# Patient Record
Sex: FEMALE | Race: WHITE | NOT HISPANIC OR LATINO | Employment: OTHER | URBAN - METROPOLITAN AREA
[De-identification: names, ages, dates, MRNs, and addresses within clinical notes are randomized per-mention and may not be internally consistent; named-entity substitution may affect disease eponyms.]

---

## 2017-07-20 PROBLEM — M16.12 ARTHRITIS OF LEFT HIP: Status: ACTIVE | Noted: 2017-07-20

## 2017-07-20 PROBLEM — M50.90 CERVICAL DISC DISEASE: Status: ACTIVE | Noted: 2017-07-20

## 2017-07-20 PROBLEM — M16.12 ARTHRITIS OF LEFT HIP: Status: RESOLVED | Noted: 2017-07-20 | Resolved: 2017-07-20

## 2018-01-22 PROBLEM — K76.0 HEPATIC STEATOSIS: Status: ACTIVE | Noted: 2018-01-22

## 2018-12-21 ENCOUNTER — OFFICE VISIT (OUTPATIENT)
Dept: OBSTETRICS AND GYNECOLOGY | Facility: CLINIC | Age: 68
End: 2018-12-21
Attending: OBSTETRICS & GYNECOLOGY
Payer: MEDICARE

## 2018-12-21 VITALS — HEIGHT: 65 IN | BODY MASS INDEX: 27.29 KG/M2 | WEIGHT: 163.81 LBS

## 2018-12-21 DIAGNOSIS — N95.2 VAGINAL ATROPHY: Primary | ICD-10-CM

## 2018-12-21 PROCEDURE — 99213 OFFICE O/P EST LOW 20 MIN: CPT | Mod: PBBFAC,PN | Performed by: OBSTETRICS & GYNECOLOGY

## 2018-12-21 PROCEDURE — 99203 OFFICE O/P NEW LOW 30 MIN: CPT | Mod: S$PBB,,, | Performed by: OBSTETRICS & GYNECOLOGY

## 2018-12-21 PROCEDURE — 99999 PR PBB SHADOW E&M-EST. PATIENT-LVL III: CPT | Mod: PBBFAC,,, | Performed by: OBSTETRICS & GYNECOLOGY

## 2018-12-21 NOTE — PROGRESS NOTES
"Chief Complaint   Patient presents with    lump in vaginal area       History of Present Illness      68 y.o.  female patient presents today for vaginal bulge noted whrn trying to place vaginal lubricant - no pain or other symptoms.         Past medical and surgical history reviewed.   I have reviewed the patient's medical history in detail and updated the computerized patient record.    Review of patient's allergies indicates:   Allergen Reactions    Ciprofloxacin      Stomach pain      Sulfa (sulfonamide antibiotics)      Stomach          Review of Systems - Negative except HPI  GEN ROS: negative for - chills or fever  Breast ROS: negative for breast lumps  Genito-Urinary ROS: no dysuria, trouble voiding, or hematuria      Physical Examination:  Ht 5' 5" (1.651 m)   Wt 74.3 kg (163 lb 12.8 oz)   BMI 27.26 kg/m²    Constitutional: She is oriented to person, place, and time. She appears well-developed and well-nourished. No distress.   HENT:   Head: Normocephalic and atraumatic.   Eyes: Conjunctivae and EOM are normal. No scleral icterus.   Neck: Normal range of motion. Neck supple. No tracheal deviation present.   Cardiovascular: Normal rate.    Pulmonary/Chest: Effort normal. No respiratory distress. She exhibits no tenderness.  Abdominal: Soft. She exhibits no distension and no mass. There is no tenderness. There is no rebound and no guarding.   Genitourinary:    External rectal exam shows no thrombosed external hemorrhoids.    Pelvic exam was performed with patient supine.   No labial fusion.   There is no rash, lesion or injury on the right labia.   There is no rash, lesion or injury on the left labia.   No bleeding and no signs of injury around the vaginal introitus, urethra is without lesions and well supported.    No vaginal discharge found. Pale and atrophic   No significant Cystocele, Enterocele or rectocele, and cuff well supported.   Bimanual exam:   The urethra and vagina are without " palpable masses or tenderness. AREA OF BULGE IS POSTERIOR FOURCHETTE - OLD EPISIOTOMY SCAR.    Uterus and cervix are surgically absents, vaginal cuff is intact and well supported.   Right adnexum displays no mass and no tenderness.   Left adnexum displays no mass and no tenderness.  Musculoskeletal: Normal range of motion.   Lymphadenopathy: No inguinal adenopathy present.   Neurological: She is alert and oriented to person, place, and time. Coordination normal.   Skin: Skin is warm and dry. She is not diaphoretic.   Psychiatric: She has a normal mood and affect.          Assessment:  NORMAL pelvic - vaginal atrophy  H/o breast cancer    Plan:  hyaloGYN twice weekly  Follow up 1 month if not improving  No need for annual PAP screening.   Patient informed will be contacted with results within 2 weeks. Encouraged to please call back or email if she has not heard from us by then.

## 2019-07-24 PROBLEM — R09.82 POSTNASAL DRIP: Status: ACTIVE | Noted: 2019-07-24

## 2019-07-24 PROBLEM — M79.7 FIBROMYALGIA: Status: ACTIVE | Noted: 2019-07-24

## 2020-12-21 ENCOUNTER — PATIENT MESSAGE (OUTPATIENT)
Dept: OTHER | Facility: OTHER | Age: 70
End: 2020-12-21

## 2021-01-13 ENCOUNTER — CLINICAL SUPPORT (OUTPATIENT)
Dept: URGENT CARE | Facility: CLINIC | Age: 71
End: 2021-01-13
Payer: MEDICARE

## 2021-01-13 DIAGNOSIS — Z11.52 ENCOUNTER FOR SCREENING LABORATORY TESTING FOR COVID-19 VIRUS: Primary | ICD-10-CM

## 2021-01-13 LAB
CTP QC/QA: YES
SARS-COV-2 RDRP RESP QL NAA+PROBE: NEGATIVE

## 2021-01-13 PROCEDURE — U0002: ICD-10-PCS | Mod: QW,CR,S$GLB, | Performed by: FAMILY MEDICINE

## 2021-01-13 PROCEDURE — U0002 COVID-19 LAB TEST NON-CDC: HCPCS | Mod: QW,CR,S$GLB, | Performed by: FAMILY MEDICINE

## 2021-02-19 ENCOUNTER — OFFICE VISIT (OUTPATIENT)
Dept: SPINE | Facility: CLINIC | Age: 71
End: 2021-02-19
Payer: MEDICARE

## 2021-02-19 ENCOUNTER — TELEPHONE (OUTPATIENT)
Dept: SPINE | Facility: CLINIC | Age: 71
End: 2021-02-19

## 2021-02-19 VITALS
WEIGHT: 170.88 LBS | SYSTOLIC BLOOD PRESSURE: 169 MMHG | HEART RATE: 71 BPM | BODY MASS INDEX: 32.26 KG/M2 | DIASTOLIC BLOOD PRESSURE: 79 MMHG | HEIGHT: 61 IN

## 2021-02-19 DIAGNOSIS — M47.812 CERVICAL SPONDYLOSIS: Primary | ICD-10-CM

## 2021-02-19 DIAGNOSIS — M50.90 CERVICAL DISC DISEASE: ICD-10-CM

## 2021-02-19 DIAGNOSIS — M54.12 RADICULOPATHY, CERVICAL REGION: ICD-10-CM

## 2021-02-19 PROCEDURE — 99204 OFFICE O/P NEW MOD 45 MIN: CPT | Mod: S$PBB,,, | Performed by: PHYSICIAN ASSISTANT

## 2021-02-19 PROCEDURE — 99999 PR PBB SHADOW E&M-EST. PATIENT-LVL V: CPT | Mod: PBBFAC,,, | Performed by: PHYSICIAN ASSISTANT

## 2021-02-19 PROCEDURE — 99215 OFFICE O/P EST HI 40 MIN: CPT | Mod: PBBFAC,PN | Performed by: PHYSICIAN ASSISTANT

## 2021-02-19 PROCEDURE — 99999 PR PBB SHADOW E&M-EST. PATIENT-LVL V: ICD-10-PCS | Mod: PBBFAC,,, | Performed by: PHYSICIAN ASSISTANT

## 2021-02-19 PROCEDURE — 99204 PR OFFICE/OUTPT VISIT, NEW, LEVL IV, 45-59 MIN: ICD-10-PCS | Mod: S$PBB,,, | Performed by: PHYSICIAN ASSISTANT

## 2021-02-19 RX ORDER — TIZANIDINE 2 MG/1
2 TABLET ORAL NIGHTLY PRN
Qty: 40 TABLET | Refills: 0 | Status: SHIPPED | OUTPATIENT
Start: 2021-02-19 | End: 2021-02-19

## 2021-03-08 ENCOUNTER — HOSPITAL ENCOUNTER (OUTPATIENT)
Dept: RADIOLOGY | Facility: HOSPITAL | Age: 71
Discharge: HOME | End: 2021-03-08
Attending: PHYSICIAN ASSISTANT
Payer: MEDICARE

## 2021-03-08 DIAGNOSIS — M54.12 RADICULOPATHY, CERVICAL REGION: ICD-10-CM

## 2021-03-08 DIAGNOSIS — M47.812 CERVICAL SPONDYLOSIS: ICD-10-CM

## 2021-03-08 DIAGNOSIS — M50.90 CERVICAL DISC DISEASE: ICD-10-CM

## 2021-03-08 PROCEDURE — 72052 X-RAY EXAM NECK SPINE 6/>VWS: CPT | Mod: TC,FY,PO

## 2021-03-08 PROCEDURE — 72141 MRI CERVICAL SPINE WITHOUT CONTRAST: ICD-10-PCS | Mod: 26,,, | Performed by: RADIOLOGY

## 2021-03-08 PROCEDURE — 72052 X-RAY EXAM NECK SPINE 6/>VWS: CPT | Mod: 26,,, | Performed by: RADIOLOGY

## 2021-03-08 PROCEDURE — 72141 MRI NECK SPINE W/O DYE: CPT | Mod: TC,PO

## 2021-03-08 PROCEDURE — 72052 XR CERVICAL SPINE 5 VIEW WITH FLEX AND EXT: ICD-10-PCS | Mod: 26,,, | Performed by: RADIOLOGY

## 2021-03-08 PROCEDURE — 72141 MRI NECK SPINE W/O DYE: CPT | Mod: 26,,, | Performed by: RADIOLOGY

## 2021-03-11 ENCOUNTER — OFFICE VISIT (OUTPATIENT)
Dept: SPINE | Facility: CLINIC | Age: 71
End: 2021-03-11
Payer: MEDICARE

## 2021-03-11 VITALS
HEART RATE: 75 BPM | WEIGHT: 170.88 LBS | DIASTOLIC BLOOD PRESSURE: 82 MMHG | HEIGHT: 61 IN | SYSTOLIC BLOOD PRESSURE: 174 MMHG | BODY MASS INDEX: 32.26 KG/M2

## 2021-03-11 DIAGNOSIS — M54.12 RADICULOPATHY, CERVICAL REGION: Primary | ICD-10-CM

## 2021-03-11 DIAGNOSIS — M47.812 CERVICAL SPONDYLOSIS: ICD-10-CM

## 2021-03-11 PROCEDURE — 99214 OFFICE O/P EST MOD 30 MIN: CPT | Mod: S$PBB,,, | Performed by: PHYSICIAN ASSISTANT

## 2021-03-11 PROCEDURE — 99999 PR PBB SHADOW E&M-EST. PATIENT-LVL V: CPT | Mod: PBBFAC,,, | Performed by: PHYSICIAN ASSISTANT

## 2021-03-11 PROCEDURE — 99999 PR PBB SHADOW E&M-EST. PATIENT-LVL V: ICD-10-PCS | Mod: PBBFAC,,, | Performed by: PHYSICIAN ASSISTANT

## 2021-03-11 PROCEDURE — 99215 OFFICE O/P EST HI 40 MIN: CPT | Mod: PBBFAC,PN | Performed by: PHYSICIAN ASSISTANT

## 2021-03-11 PROCEDURE — 99214 PR OFFICE/OUTPT VISIT, EST, LEVL IV, 30-39 MIN: ICD-10-PCS | Mod: S$PBB,,, | Performed by: PHYSICIAN ASSISTANT

## 2021-04-21 ENCOUNTER — OFFICE VISIT (OUTPATIENT)
Dept: PAIN MEDICINE | Facility: CLINIC | Age: 71
End: 2021-04-21
Payer: MEDICARE

## 2021-04-21 VITALS
SYSTOLIC BLOOD PRESSURE: 178 MMHG | DIASTOLIC BLOOD PRESSURE: 80 MMHG | HEIGHT: 61 IN | HEART RATE: 73 BPM | OXYGEN SATURATION: 98 % | WEIGHT: 168.88 LBS | BODY MASS INDEX: 31.88 KG/M2

## 2021-04-21 DIAGNOSIS — Z01.818 PRE-OP TESTING: ICD-10-CM

## 2021-04-21 DIAGNOSIS — M47.812 CERVICAL SPONDYLOSIS: ICD-10-CM

## 2021-04-21 DIAGNOSIS — M50.30 DDD (DEGENERATIVE DISC DISEASE), CERVICAL: ICD-10-CM

## 2021-04-21 DIAGNOSIS — M54.12 RADICULOPATHY, CERVICAL REGION: Primary | ICD-10-CM

## 2021-04-21 PROCEDURE — 99204 PR OFFICE/OUTPT VISIT, NEW, LEVL IV, 45-59 MIN: ICD-10-PCS | Mod: S$PBB,,, | Performed by: ANESTHESIOLOGY

## 2021-04-21 PROCEDURE — 99999 PR PBB SHADOW E&M-EST. PATIENT-LVL IV: ICD-10-PCS | Mod: PBBFAC,,, | Performed by: ANESTHESIOLOGY

## 2021-04-21 PROCEDURE — 99999 PR PBB SHADOW E&M-EST. PATIENT-LVL IV: CPT | Mod: PBBFAC,,, | Performed by: ANESTHESIOLOGY

## 2021-04-21 PROCEDURE — 99204 OFFICE O/P NEW MOD 45 MIN: CPT | Mod: S$PBB,,, | Performed by: ANESTHESIOLOGY

## 2021-04-21 PROCEDURE — 99214 OFFICE O/P EST MOD 30 MIN: CPT | Mod: PBBFAC,PN | Performed by: ANESTHESIOLOGY

## 2021-04-21 RX ORDER — SODIUM CHLORIDE, SODIUM LACTATE, POTASSIUM CHLORIDE, CALCIUM CHLORIDE 600; 310; 30; 20 MG/100ML; MG/100ML; MG/100ML; MG/100ML
INJECTION, SOLUTION INTRAVENOUS CONTINUOUS
Status: CANCELLED | OUTPATIENT
Start: 2021-05-06

## 2021-04-21 RX ORDER — TIZANIDINE 4 MG/1
4 TABLET ORAL NIGHTLY PRN
Qty: 30 TABLET | Refills: 0 | Status: SHIPPED | OUTPATIENT
Start: 2021-04-21 | End: 2021-05-21

## 2021-05-03 ENCOUNTER — LAB VISIT (OUTPATIENT)
Dept: FAMILY MEDICINE | Facility: CLINIC | Age: 71
End: 2021-05-03
Payer: MEDICARE

## 2021-05-03 DIAGNOSIS — Z01.818 PRE-OP TESTING: ICD-10-CM

## 2021-05-03 PROCEDURE — U0003 INFECTIOUS AGENT DETECTION BY NUCLEIC ACID (DNA OR RNA); SEVERE ACUTE RESPIRATORY SYNDROME CORONAVIRUS 2 (SARS-COV-2) (CORONAVIRUS DISEASE [COVID-19]), AMPLIFIED PROBE TECHNIQUE, MAKING USE OF HIGH THROUGHPUT TECHNOLOGIES AS DESCRIBED BY CMS-2020-01-R: HCPCS | Performed by: ANESTHESIOLOGY

## 2021-05-03 PROCEDURE — U0005 INFEC AGEN DETEC AMPLI PROBE: HCPCS | Performed by: ANESTHESIOLOGY

## 2021-05-04 LAB — SARS-COV-2 RNA RESP QL NAA+PROBE: NOT DETECTED

## 2021-05-06 ENCOUNTER — HOSPITAL ENCOUNTER (OUTPATIENT)
Dept: RADIOLOGY | Facility: HOSPITAL | Age: 71
Discharge: HOME | End: 2021-05-06
Attending: ANESTHESIOLOGY
Payer: MEDICARE

## 2021-05-06 ENCOUNTER — HOSPITAL ENCOUNTER (OUTPATIENT)
Facility: HOSPITAL | Age: 71
Discharge: HOME | End: 2021-05-06
Attending: ANESTHESIOLOGY | Admitting: ANESTHESIOLOGY
Payer: MEDICARE

## 2021-05-06 DIAGNOSIS — M54.12 RADICULOPATHY, CERVICAL REGION: ICD-10-CM

## 2021-05-06 PROCEDURE — 62321 NJX INTERLAMINAR CRV/THRC: CPT | Mod: PO | Performed by: ANESTHESIOLOGY

## 2021-05-06 PROCEDURE — 63600175 PHARM REV CODE 636 W HCPCS: Mod: PO | Performed by: ANESTHESIOLOGY

## 2021-05-06 PROCEDURE — 62321 PR INJ CERV/THORAC, W/GUIDANCE: ICD-10-PCS | Mod: ,,, | Performed by: ANESTHESIOLOGY

## 2021-05-06 PROCEDURE — 25000003 PHARM REV CODE 250: Mod: PO | Performed by: ANESTHESIOLOGY

## 2021-05-06 PROCEDURE — 62321 NJX INTERLAMINAR CRV/THRC: CPT | Mod: ,,, | Performed by: ANESTHESIOLOGY

## 2021-05-06 PROCEDURE — 76000 FLUOROSCOPY <1 HR PHYS/QHP: CPT | Mod: TC,PO

## 2021-05-06 PROCEDURE — 25500020 PHARM REV CODE 255: Mod: PO | Performed by: ANESTHESIOLOGY

## 2021-05-06 RX ORDER — MIDAZOLAM HYDROCHLORIDE 2 MG/2ML
INJECTION, SOLUTION INTRAMUSCULAR; INTRAVENOUS
Status: DISCONTINUED | OUTPATIENT
Start: 2021-05-06 | End: 2021-05-06 | Stop reason: HOSPADM

## 2021-05-06 RX ORDER — METHYLPREDNISOLONE ACETATE 80 MG/ML
INJECTION, SUSPENSION INTRA-ARTICULAR; INTRALESIONAL; INTRAMUSCULAR; SOFT TISSUE
Status: DISCONTINUED | OUTPATIENT
Start: 2021-05-06 | End: 2021-05-06 | Stop reason: HOSPADM

## 2021-05-06 RX ORDER — HYDROCODONE BITARTRATE AND ACETAMINOPHEN 5; 325 MG/1; MG/1
1 TABLET ORAL ONCE
Status: COMPLETED | OUTPATIENT
Start: 2021-05-06 | End: 2021-05-06

## 2021-05-06 RX ORDER — SODIUM CHLORIDE, SODIUM LACTATE, POTASSIUM CHLORIDE, CALCIUM CHLORIDE 600; 310; 30; 20 MG/100ML; MG/100ML; MG/100ML; MG/100ML
INJECTION, SOLUTION INTRAVENOUS CONTINUOUS
Status: DISCONTINUED | OUTPATIENT
Start: 2021-05-06 | End: 2021-05-06 | Stop reason: HOSPADM

## 2021-05-06 RX ORDER — LIDOCAINE HYDROCHLORIDE 10 MG/ML
INJECTION, SOLUTION EPIDURAL; INFILTRATION; INTRACAUDAL; PERINEURAL
Status: DISCONTINUED | OUTPATIENT
Start: 2021-05-06 | End: 2021-05-06 | Stop reason: HOSPADM

## 2021-05-06 RX ADMIN — SODIUM CHLORIDE, SODIUM LACTATE, POTASSIUM CHLORIDE, AND CALCIUM CHLORIDE: .6; .31; .03; .02 INJECTION, SOLUTION INTRAVENOUS at 01:05

## 2021-05-06 RX ADMIN — HYDROCODONE BITARTRATE AND ACETAMINOPHEN 1 TABLET: 5; 325 TABLET ORAL at 03:05

## 2021-05-07 VITALS
TEMPERATURE: 98 F | OXYGEN SATURATION: 95 % | HEIGHT: 61 IN | SYSTOLIC BLOOD PRESSURE: 181 MMHG | RESPIRATION RATE: 18 BRPM | BODY MASS INDEX: 31.72 KG/M2 | DIASTOLIC BLOOD PRESSURE: 77 MMHG | HEART RATE: 80 BPM | WEIGHT: 168 LBS

## 2021-05-27 ENCOUNTER — OFFICE VISIT (OUTPATIENT)
Dept: PAIN MEDICINE | Facility: CLINIC | Age: 71
End: 2021-05-27
Payer: MEDICARE

## 2021-05-27 VITALS
OXYGEN SATURATION: 97 % | TEMPERATURE: 96 F | RESPIRATION RATE: 18 BRPM | BODY MASS INDEX: 32.32 KG/M2 | WEIGHT: 171.06 LBS | HEART RATE: 79 BPM | DIASTOLIC BLOOD PRESSURE: 66 MMHG | SYSTOLIC BLOOD PRESSURE: 149 MMHG

## 2021-05-27 DIAGNOSIS — M47.812 CERVICAL SPONDYLOSIS: ICD-10-CM

## 2021-05-27 DIAGNOSIS — M79.18 MYOFASCIAL PAIN: ICD-10-CM

## 2021-05-27 DIAGNOSIS — M50.30 DDD (DEGENERATIVE DISC DISEASE), CERVICAL: Primary | ICD-10-CM

## 2021-05-27 PROCEDURE — 99214 OFFICE O/P EST MOD 30 MIN: CPT | Mod: S$PBB,,, | Performed by: PHYSICIAN ASSISTANT

## 2021-05-27 PROCEDURE — 99214 PR OFFICE/OUTPT VISIT, EST, LEVL IV, 30-39 MIN: ICD-10-PCS | Mod: S$PBB,,, | Performed by: PHYSICIAN ASSISTANT

## 2021-05-27 PROCEDURE — 99999 PR PBB SHADOW E&M-EST. PATIENT-LVL IV: ICD-10-PCS | Mod: PBBFAC,,, | Performed by: PHYSICIAN ASSISTANT

## 2021-05-27 PROCEDURE — 99214 OFFICE O/P EST MOD 30 MIN: CPT | Mod: PBBFAC,PN | Performed by: PHYSICIAN ASSISTANT

## 2021-05-27 PROCEDURE — 99999 PR PBB SHADOW E&M-EST. PATIENT-LVL IV: CPT | Mod: PBBFAC,,, | Performed by: PHYSICIAN ASSISTANT

## 2021-05-31 ENCOUNTER — PATIENT MESSAGE (OUTPATIENT)
Dept: PAIN MEDICINE | Facility: CLINIC | Age: 71
End: 2021-05-31

## 2023-09-18 PROBLEM — F33.1 MAJOR DEPRESSIVE DISORDER, RECURRENT, MODERATE: Status: ACTIVE | Noted: 2023-09-18

## 2024-03-12 ENCOUNTER — TELEPHONE (OUTPATIENT)
Dept: HEMATOLOGY/ONCOLOGY | Facility: CLINIC | Age: 74
End: 2024-03-12
Payer: MEDICARE

## 2024-03-12 NOTE — TELEPHONE ENCOUNTER
Received msg, Secure chat from Dr Juárez about pt's referral in [facility] and to schedule her on oncology ivana.  [facility] for pt to call back to schedule.      NOTE:  Per referring physician, MASHA Doran NP, she was unable to contact the pt to inform her of the [facility] referral.      Pt returned call.  She scheduled appt with hemon with Dr Juárez for next Monday.    She said she just scheduled appt with Dr Juan for 3/27.     Per MASHA Doran, I informed her that  MASHA Doran NP will be calling her today to discuss results and any questions she may have.  Pt confirmed the appts and no other needs at this time.

## 2024-03-18 ENCOUNTER — OFFICE VISIT (OUTPATIENT)
Dept: HEMATOLOGY/ONCOLOGY | Facility: CLINIC | Age: 74
End: 2024-03-18
Payer: MEDICARE

## 2024-03-18 ENCOUNTER — LAB VISIT (OUTPATIENT)
Dept: LAB | Facility: HOSPITAL | Age: 74
End: 2024-03-18
Attending: INTERNAL MEDICINE
Payer: MEDICARE

## 2024-03-18 VITALS
RESPIRATION RATE: 16 BRPM | HEIGHT: 61 IN | BODY MASS INDEX: 32.84 KG/M2 | OXYGEN SATURATION: 96 % | SYSTOLIC BLOOD PRESSURE: 154 MMHG | HEART RATE: 89 BPM | DIASTOLIC BLOOD PRESSURE: 80 MMHG | TEMPERATURE: 98 F | WEIGHT: 173.94 LBS

## 2024-03-18 DIAGNOSIS — R53.82 CHRONIC FATIGUE, UNSPECIFIED: ICD-10-CM

## 2024-03-18 DIAGNOSIS — R92.8 ABNORMAL MAMMOGRAM OF LEFT BREAST: ICD-10-CM

## 2024-03-18 DIAGNOSIS — Z85.3 HISTORY OF BREAST CANCER: ICD-10-CM

## 2024-03-18 DIAGNOSIS — C82.14 GRADE 2 FOLLICULAR LYMPHOMA OF LYMPH NODES OF AXILLA: ICD-10-CM

## 2024-03-18 PROBLEM — C82.90 FOLLICULAR LYMPHOMA: Status: ACTIVE | Noted: 2024-03-18

## 2024-03-18 LAB
ALBUMIN SERPL BCP-MCNC: 4.6 G/DL (ref 3.5–5.2)
ALP SERPL-CCNC: 49 U/L (ref 55–135)
ALT SERPL W/O P-5'-P-CCNC: 51 U/L (ref 10–44)
ANION GAP SERPL CALC-SCNC: 11 MMOL/L (ref 8–16)
AST SERPL-CCNC: 32 U/L (ref 10–40)
BASOPHILS # BLD AUTO: 0.04 K/UL (ref 0–0.2)
BASOPHILS NFR BLD: 0.5 % (ref 0–1.9)
BILIRUB SERPL-MCNC: 0.4 MG/DL (ref 0.1–1)
BUN SERPL-MCNC: 18 MG/DL (ref 8–23)
CALCIUM SERPL-MCNC: 10.2 MG/DL (ref 8.7–10.5)
CHLORIDE SERPL-SCNC: 101 MMOL/L (ref 95–110)
CO2 SERPL-SCNC: 27 MMOL/L (ref 23–29)
CREAT SERPL-MCNC: 0.9 MG/DL (ref 0.5–1.4)
DIFFERENTIAL METHOD BLD: NORMAL
EOSINOPHIL # BLD AUTO: 0.1 K/UL (ref 0–0.5)
EOSINOPHIL NFR BLD: 1.7 % (ref 0–8)
ERYTHROCYTE [DISTWIDTH] IN BLOOD BY AUTOMATED COUNT: 12.6 % (ref 11.5–14.5)
ERYTHROCYTE [SEDIMENTATION RATE] IN BLOOD BY PHOTOMETRIC METHOD: 18 MM/HR (ref 0–36)
EST. GFR  (NO RACE VARIABLE): >60 ML/MIN/1.73 M^2
GLUCOSE SERPL-MCNC: 148 MG/DL (ref 70–110)
HAV IGM SERPL QL IA: NORMAL
HBV CORE IGM SERPL QL IA: NORMAL
HBV SURFACE AG SERPL QL IA: NORMAL
HCT VFR BLD AUTO: 40.8 % (ref 37–48.5)
HCV AB SERPL QL IA: NORMAL
HGB BLD-MCNC: 14.7 G/DL (ref 12–16)
HIV 1+2 AB+HIV1 P24 AG SERPL QL IA: NORMAL
IMM GRANULOCYTES # BLD AUTO: 0.03 K/UL (ref 0–0.04)
IMM GRANULOCYTES NFR BLD AUTO: 0.4 % (ref 0–0.5)
LDH SERPL L TO P-CCNC: 191 U/L (ref 110–260)
LYMPHOCYTES # BLD AUTO: 2.4 K/UL (ref 1–4.8)
LYMPHOCYTES NFR BLD: 28.7 % (ref 18–48)
MCH RBC QN AUTO: 29.7 PG (ref 27–31)
MCHC RBC AUTO-ENTMCNC: 36 G/DL (ref 32–36)
MCV RBC AUTO: 82 FL (ref 82–98)
MONOCYTES # BLD AUTO: 0.5 K/UL (ref 0.3–1)
MONOCYTES NFR BLD: 5.7 % (ref 4–15)
NEUTROPHILS # BLD AUTO: 5.3 K/UL (ref 1.8–7.7)
NEUTROPHILS NFR BLD: 63 % (ref 38–73)
NRBC BLD-RTO: 0 /100 WBC
PLATELET # BLD AUTO: 228 K/UL (ref 150–450)
PMV BLD AUTO: 10.3 FL (ref 9.2–12.9)
POTASSIUM SERPL-SCNC: 3.8 MMOL/L (ref 3.5–5.1)
PROT SERPL-MCNC: 7.5 G/DL (ref 6–8.4)
RBC # BLD AUTO: 4.95 M/UL (ref 4–5.4)
SODIUM SERPL-SCNC: 139 MMOL/L (ref 136–145)
URATE SERPL-MCNC: 5.7 MG/DL (ref 2.4–5.7)
WBC # BLD AUTO: 8.44 K/UL (ref 3.9–12.7)

## 2024-03-18 PROCEDURE — 1160F RVW MEDS BY RX/DR IN RCRD: CPT | Mod: CPTII,S$GLB,, | Performed by: INTERNAL MEDICINE

## 2024-03-18 PROCEDURE — 36415 COLL VENOUS BLD VENIPUNCTURE: CPT | Mod: PN | Performed by: INTERNAL MEDICINE

## 2024-03-18 PROCEDURE — 1126F AMNT PAIN NOTED NONE PRSNT: CPT | Mod: CPTII,S$GLB,, | Performed by: INTERNAL MEDICINE

## 2024-03-18 PROCEDURE — 3008F BODY MASS INDEX DOCD: CPT | Mod: CPTII,S$GLB,, | Performed by: INTERNAL MEDICINE

## 2024-03-18 PROCEDURE — 85025 COMPLETE CBC W/AUTO DIFF WBC: CPT | Mod: PN | Performed by: INTERNAL MEDICINE

## 2024-03-18 PROCEDURE — 80053 COMPREHEN METABOLIC PANEL: CPT | Mod: PO | Performed by: INTERNAL MEDICINE

## 2024-03-18 PROCEDURE — 1159F MED LIST DOCD IN RCRD: CPT | Mod: CPTII,S$GLB,, | Performed by: INTERNAL MEDICINE

## 2024-03-18 PROCEDURE — 4010F ACE/ARB THERAPY RXD/TAKEN: CPT | Mod: CPTII,S$GLB,, | Performed by: INTERNAL MEDICINE

## 2024-03-18 PROCEDURE — 99205 OFFICE O/P NEW HI 60 MIN: CPT | Mod: S$GLB,,, | Performed by: INTERNAL MEDICINE

## 2024-03-18 PROCEDURE — 87389 HIV-1 AG W/HIV-1&-2 AB AG IA: CPT | Performed by: INTERNAL MEDICINE

## 2024-03-18 PROCEDURE — 1101F PT FALLS ASSESS-DOCD LE1/YR: CPT | Mod: CPTII,S$GLB,, | Performed by: INTERNAL MEDICINE

## 2024-03-18 PROCEDURE — 3077F SYST BP >= 140 MM HG: CPT | Mod: CPTII,S$GLB,, | Performed by: INTERNAL MEDICINE

## 2024-03-18 PROCEDURE — 80074 ACUTE HEPATITIS PANEL: CPT | Performed by: INTERNAL MEDICINE

## 2024-03-18 PROCEDURE — 84550 ASSAY OF BLOOD/URIC ACID: CPT | Mod: PO | Performed by: INTERNAL MEDICINE

## 2024-03-18 PROCEDURE — 3288F FALL RISK ASSESSMENT DOCD: CPT | Mod: CPTII,S$GLB,, | Performed by: INTERNAL MEDICINE

## 2024-03-18 PROCEDURE — 99999 PR PBB SHADOW E&M-EST. PATIENT-LVL IV: CPT | Mod: PBBFAC,,, | Performed by: INTERNAL MEDICINE

## 2024-03-18 PROCEDURE — 85652 RBC SED RATE AUTOMATED: CPT | Performed by: INTERNAL MEDICINE

## 2024-03-18 PROCEDURE — 83615 LACTATE (LD) (LDH) ENZYME: CPT | Mod: PO | Performed by: INTERNAL MEDICINE

## 2024-03-18 PROCEDURE — 3079F DIAST BP 80-89 MM HG: CPT | Mod: CPTII,S$GLB,, | Performed by: INTERNAL MEDICINE

## 2024-03-18 RX ORDER — SOLIFENACIN SUCCINATE 5 MG/1
5 TABLET, FILM COATED ORAL DAILY PRN
COMMUNITY
Start: 2024-01-22

## 2024-03-18 NOTE — PROGRESS NOTES
Answers submitted by the patient for this visit:  Review of Systems Questionnaire (Submitted on 3/12/2024)  appetite change : No  unexpected weight change: No  mouth sores: No  visual disturbance: No  cough: No  shortness of breath: No  chest pain: No  abdominal pain: No  diarrhea: No  frequency: No  back pain: No  rash: No  headaches: No  adenopathy: No  nervous/ anxious: Yes      Subjective:       Name: Xiao Fuentes  : 1950  MRN: [id number]    Chief Complaint   Patient presents with    Follicular lymphoma     Follicular lymphoma, hx breast ca/ [place]        Patient is in clinic with her     HPI: Xiao Fuentes is a 73 y.o. female presents for evaluation of her newly diagnosed with Follicular lymphoma (Follicular lymphoma, hx breast ca/ [place])    Patient underwent on 2024 a mammogram diagnostic with left deanna an ultrasound of the left breast came in view of her personal history of breast cancer.  She was found to have a left breast mass measuring 6 mm x 4 mm x 6 mm at 2:00 a.m..  There was an abnormal lymph node also noted an ultrasound-guided biopsy was recommended.    She underwent on 2024 a left breast biopsy that showed no tumor seen.  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  The Ki-67 was very low.      The patient denies any fever chills night sweats unintentional weight loss nausea vomiting abdominal pain cough phlegm melena hematochezia or hematemesis.    Mother had NHL in her late 70's. She had a stage 3. She had chemotherapy and wet into remission. She relapse nichol year. She received further systemic therapy.  First paternal cousin had breast cancer in her 40's    Oncology History    No history exists.        Past Medical History:   Diagnosis Date    Anxiety     Breast cancer     Colon polyps     Depression     Gastro-esophageal reflux     History of chicken pox     Hyperlipidemia     Hypothyroidism      Migraine     Shingles 10/31/2018    seen by urgent care    Usual hyperplasia of lactiferous duct        Past Surgical History:   Procedure Laterality Date    BREAST BIOPSY Left 1998    BREAST BIOPSY Bilateral 1999    BREAST LUMPECTOMY Left 1998    pt had radiation    CARPAL TUNNEL RELEASE      COLONOSCOPY      EPIDURAL STEROID INJECTION INTO CERVICAL SPINE N/A 2021    Procedure: Injection-steroid-epidural-cervical;  Surgeon: Tad Dyer MD;  Location: [facility] OR;  Service: Pain Management;  Laterality: N/A;    HYSTERECTOMY      OOPHORECTOMY      TOE SURGERY Right     right big toe ingrown        Family History   Problem Relation Age of Onset    Lymphoma Mother     Thyroid disease Mother     Hypertension Mother     Diabetes Father     Heart disease Father     Hyperlipidemia Father     Hypertension Father     Stroke Father     Breast cancer Paternal Cousin        Social History     Socioeconomic History    Marital status:      Spouse name: Fredrick    Number of children: 3   Tobacco Use    Smoking status: Former     Current packs/day: 0.00     Types: Cigarettes     Quit date: 1976     Years since quittin.2    Smokeless tobacco: Never   Substance and Sexual Activity    Alcohol use: Yes     Alcohol/week: 1.0 standard drink of alcohol     Types: 1 Glasses of wine per week     Comment: rarely    Drug use: No    Sexual activity: Yes     Partners: Male     Social Determinants of Health     Financial Resource Strain: Low Risk  (11/10/2023)    Overall Financial Resource Strain (CARDIA)     Difficulty of Paying Living Expenses: Not hard at all   Food Insecurity: No Food Insecurity (11/10/2023)    Hunger Vital Sign     Worried About Running Out of Food in the Last Year: Never true     Ran Out of Food in the Last Year: Never true   Transportation Needs: No Transportation Needs (11/10/2023)    PRAPARE - Transportation     Lack of Transportation (Medical): No     Lack of Transportation (Non-Medical): No    Physical Activity: Unknown (11/10/2023)    Exercise Vital Sign     Days of Exercise per Week: 0 days   Recent Concern: Physical Activity - Inactive (11/10/2023)    Exercise Vital Sign     Days of Exercise per Week: 0 days     Minutes of Exercise per Session: 0 min   Stress: Stress Concern Present (11/10/2023)    [place] [employer] - Occupational Stress Questionnaire     Feeling of Stress : To some extent   Social Connections: Unknown (11/10/2023)    Social Connection and Isolation Panel [NHANES]     Frequency of Communication with Friends and Family: More than three times a week     Frequency of Social Gatherings with Friends and Family: More than three times a week     Active Member of Clubs or Organizations: No     Attends Club or Organization Meetings: Never     Marital Status:    Housing Stability: Unknown (11/10/2023)    Housing Stability Vital Sign     Unable to Pay for Housing in the Last Year: No     Unstable Housing in the Last Year: No       Review of patient's allergies indicates:   Allergen Reactions    Ciprofloxacin      Stomach pain      Sulfa (sulfonamide antibiotics)      Stomach        Review of Systems   Constitutional:  Negative for appetite change and unexpected weight change.   HENT:  Negative for mouth sores.    Eyes:  Negative for visual disturbance.   Respiratory:  Negative for cough and shortness of breath.    Cardiovascular:  Negative for chest pain.   Gastrointestinal:  Negative for abdominal pain and diarrhea.   Genitourinary:  Negative for frequency.   Musculoskeletal:  Negative for back pain.   Integumentary:  Negative for rash.   Neurological:  Negative for headaches.   Hematological:  Negative for adenopathy.   Psychiatric/Behavioral:  The patient is nervous/anxious.             Objective:     Vitals:    03/18/24 1117   BP: (!) 154/80   BP Location: Left arm   Patient Position: Sitting   BP Method: Medium (Automatic)   Pulse: 89   Resp: 16   Temp: 98 °F  "(36.7 °C)   TempSrc: Oral   SpO2: 96%   Weight: 78.9 kg (173 lb 15.1 oz)   Height: 5' 1" (1.549 m)        Physical Exam  Vitals reviewed.   Constitutional:       Appearance: Normal appearance.   Eyes:      General: No scleral icterus.     Pupils: Pupils are equal, round, and reactive to light.   Cardiovascular:      Rate and Rhythm: Normal rate and regular rhythm.      Pulses: Normal pulses.      Heart sounds: Normal heart sounds.   Pulmonary:      Effort: Pulmonary effort is normal.      Breath sounds: Normal breath sounds.   Abdominal:      General: Bowel sounds are normal. There is no distension.   Musculoskeletal:         General: No swelling.   Lymphadenopathy:      Cervical: No cervical adenopathy.   Skin:     General: Skin is warm.      Findings: No rash.   Neurological:      General: No focal deficit present.      Mental Status: She is alert.   Psychiatric:         Mood and Affect: Mood normal.                Current Outpatient Medications on File Prior to Visit   Medication Sig    amLODIPine (NORVASC) 10 MG tablet Take 1 tablet (10 mg total) by mouth once daily.    hydroCHLOROthiazide (HYDRODIURIL) 12.5 MG Tab Take 1 tablet (12.5 mg total) by mouth once daily.    levothyroxine (SYNTHROID) 75 MCG tablet Take 1 tablet (75 mcg total) by mouth once daily.    lisinopriL (PRINIVIL,ZESTRIL) 40 MG tablet Take 1 tablet (40 mg total) by mouth once daily.    LORazepam (ATIVAN) 1 MG tablet TAKE 1 TABLET BY MOUTH TWICE DAILY AS NEEDED FOR ANXIETY    omeprazole (PRILOSEC) 40 MG capsule Take 1 capsule (40 mg total) by mouth every morning.    rosuvastatin (CRESTOR) 5 MG tablet Take 1 tablet (5 mg total) by mouth once daily.    sertraline (ZOLOFT) 100 MG tablet TAKE 1 TABLET (100 MG TOTAL) BY MOUTH ONCE DAILY.    solifenacin (VESICARE) 5 MG tablet Take 5 mg by mouth.     Current Facility-Administered Medications on File Prior to Visit   Medication    LIDOcaine-EPINEPHrine (PF) 1%-1:200,000 injection 20 mL       CBC:  Lab " Results   Component Value Date    WBC 8.44 03/18/2024    HGB 14.7 03/18/2024    HCT 40.8 03/18/2024    MCV 82 03/18/2024     03/18/2024         CMP:  Sodium   Date Value Ref Range Status   03/18/2024 139 136 - 145 mmol/L Final     Potassium   Date Value Ref Range Status   03/18/2024 3.8 3.5 - 5.1 mmol/L Final     Chloride   Date Value Ref Range Status   03/18/2024 101 95 - 110 mmol/L Final     CO2   Date Value Ref Range Status   03/18/2024 27 23 - 29 mmol/L Final     Glucose   Date Value Ref Range Status   03/18/2024 148 (H) 70 - 110 mg/dL Final     BUN   Date Value Ref Range Status   03/18/2024 18 8 - 23 mg/dL Final     Creatinine   Date Value Ref Range Status   03/18/2024 0.9 0.5 - 1.4 mg/dL Final     Calcium   Date Value Ref Range Status   03/18/2024 10.2 8.7 - 10.5 mg/dL Final     Total Protein   Date Value Ref Range Status   03/18/2024 7.5 6.0 - 8.4 g/dL Final     Albumin   Date Value Ref Range Status   03/18/2024 4.6 3.5 - 5.2 g/dL Final     Total Bilirubin   Date Value Ref Range Status   03/18/2024 0.4 0.1 - 1.0 mg/dL Final     Comment:     For infants and newborns, interpretation of results should be based  on gestational age, weight and in agreement with clinical  observations.    Premature Infant recommended reference ranges:  Up to 24 hours.............<8.0 mg/dL  Up to 48 hours............<12.0 mg/dL  3-5 days..................<15.0 mg/dL  6-29 days.................<15.0 mg/dL       Alkaline Phosphatase   Date Value Ref Range Status   03/18/2024 49 (L) 55 - 135 U/L Final     AST ([place])   Date Value Ref Range Status   03/08/2016 28 14 - 36 U/L Final     AST   Date Value Ref Range Status   03/18/2024 32 10 - 40 U/L Final     ALT   Date Value Ref Range Status   03/18/2024 51 (H) 10 - 44 U/L Final     Anion Gap   Date Value Ref Range Status   03/18/2024 11 8 - 16 mmol/L Final     eGFR if    Date Value Ref Range Status   03/14/2022 >60 >60 mL/min/1.73 m^2 Final     eGFR if non     Date Value Ref Range Status   03/14/2022 >60 >60 mL/min/1.73 m^2 Final     Comment:     Calculation used to obtain the estimated glomerular filtration  rate (eGFR) is the CKD-EPI equation.          NM PET CT FDG Skull Base to Mid Thigh  Narrative: EXAMINATION:  NM PET CT FDG SKULL BASE TO MID THIGH    CLINICAL HISTORY:  Hematologic malignancy, staging;Follicular lymphoma;  Follicular lymphoma grade ii, lymph nodes of axilla and upper limb    73-year-old female with follicular lymphoma arising in left axillary lymph nodes.    TECHNIQUE:  Following IV injection of 13.2 mCi F-18 FDG, 3D PET imaging was performed from the skull base to the mid thighs.  A noncontrast non breath hold CT was obtained in conjunction with the PET scan for attenuation correction and anatomic correlation.  PET-CT fusion images were generated.    COMPARISON:  None    FINDINGS:  Mediastinal blood pool max SUV: 2.7    Normal hepatic parenchyma max SUV: 2.2    Head/neck:    No significant abnormal hypermetabolic foci are identified within the head and neck.  No lymphadenopathy is present.    Chest:    Nonenlarged and mildly enlarged lymph nodes are present within the left axilla.  One lymph node contains a biopsy clip.  This lymph node on image 70 measures 1.9 x 0.9 cm and is poorly metabolic with a max SUV of 2.4.  Another node on image 60 measures 1.7 x 1.2 cm and is mildly hypermetabolic with a max SUV of 3.1.  No hypermetabolic pulmonary nodules or pleural effusion are present.  No mediastinal or hilar lymphadenopathy is present.    Abdomen/pelvis:    No significant abnormal hypermetabolic foci are identified within the abdomen and pelvis.  No lymphadenopathy is present.  There is mild splenomegaly.  The spleen measures 14.3 x 9.2 x 8.6 cm and has a max SUV of 2.7 which is not above mediastinal blood pool.  The max SUV spleen/liver ratio is 1.2 which is an indicator of future favorable response to therapy should therapy  would be warranted.  Incidental note is made of extensive hepatic steatosis.    Skeleton:    No significant abnormal hypermetabolic foci are identified within the skeleton.  There are no findings to suggest osseous neoplastic disease.  Impression: 1. Multiple left axillary lymph nodes.  One lymph node is mildly hypermetabolic and another lymph node which contains a biopsy clip is poorly metabolic.  2. Mild splenomegaly.  3. Max SUV spleen/liver ratio of 1.2.  See above discussion.    Electronically signed by: Wilfredo Marin MD  Date:    03/21/2024  Time:    11:28       ECOG SCORE    1 - Restricted in strenuous activity-ambulatory and able to carry out work of a light nature              Assessment/Plan:       Follicular lymphoma grade 1-2.    I reviewed independently the patient medical record including her laboratory radiologic and pathologic findings.  I discussed with her the results of her biopsy and  the natural biology of her disease.  This is an indolent non-Hodgkin's lymphoma that waxes and wanes.  This is a treatable condition but not curable.   She will require treatment if there is evidence of end-organ damage that include but not restricted to fever chills night sweats unintentional weight loss abnormal counts recurrent infections or symptomatic pain due to enlarged lymph node.  The patient will be scheduled to undergo an initial PET scan for staging purposes.    She will have blood workup drawn today for CBC CMP ESR LDH uric acid HIV acute hepatitis panel  She will be seen back again in 2 weeks to discuss the results of her workup.    History of breast cancer - DCIS  1996. Lumpectomy and RT and Tamoxifen for 5 years  In view of her history of 2 malignancy the patient will benefit from being evaluated by genetic counselor.             Med Onc Chart Routing      Follow up with physician 2 weeks. to discuss labs drawn today and PET scan to be scheduled ASAP   Follow up with MOMO    Infusion scheduling  note    Injection scheduling note    Labs    Imaging    Pharmacy appointment    Other referrals                   Plan was discussed with the patient at length, and she verbalized understanding. Xiao was given an opportunity to ask questions that were answered to her satisfaction, and she was advised to call in the interval if any problems or questions arise.  Signed:  Christine Juárez MD   Hematology and Oncology  [facility] [address] [facility] [place]

## 2024-03-21 ENCOUNTER — HOSPITAL ENCOUNTER (OUTPATIENT)
Dept: RADIOLOGY | Facility: HOSPITAL | Age: 74
Discharge: HOME | End: 2024-03-21
Attending: INTERNAL MEDICINE
Payer: MEDICARE

## 2024-03-21 DIAGNOSIS — Z85.3 HISTORY OF BREAST CANCER: ICD-10-CM

## 2024-03-21 DIAGNOSIS — C82.14 GRADE 2 FOLLICULAR LYMPHOMA OF LYMPH NODES OF AXILLA: ICD-10-CM

## 2024-03-21 LAB — GLUCOSE SERPL-MCNC: 118 MG/DL (ref 70–110)

## 2024-03-21 PROCEDURE — 78815 PET IMAGE W/CT SKULL-THIGH: CPT | Mod: TC,PN

## 2024-03-21 PROCEDURE — A9552 F18 FDG: HCPCS | Mod: PN | Performed by: INTERNAL MEDICINE

## 2024-03-21 PROCEDURE — 78815 PET IMAGE W/CT SKULL-THIGH: CPT | Mod: 26,PI,, | Performed by: RADIOLOGY

## 2024-03-21 RX ORDER — FLUDEOXYGLUCOSE F18 500 MCI/ML
12 INJECTION INTRAVENOUS
Status: COMPLETED | OUTPATIENT
Start: 2024-03-21 | End: 2024-03-21

## 2024-03-21 RX ADMIN — FLUDEOXYGLUCOSE F-18 13.2 MILLICURIE: 500 INJECTION INTRAVENOUS at 08:03

## 2024-03-21 NOTE — PROGRESS NOTES
PET Imaging Questionnaire    Are you a Diabetic? Recent Blood Sugar level? No    Are you anemic? Bone Marrow Stimulation Meds? No    Have you had a CT Scan, if so when & where was your last one? Yes -     Have you had a PET Scan, if so when & where was your last one? No    Chemotherapy or currently on Chemotherapy? No    Radiation therapy? No    Surgical History:   Past Surgical History:   Procedure Laterality Date    BREAST BIOPSY Left 1998    BREAST BIOPSY Bilateral 1999    BREAST LUMPECTOMY Left 1998    pt had radiation    CARPAL TUNNEL RELEASE      COLONOSCOPY      EPIDURAL STEROID INJECTION INTO CERVICAL SPINE N/A 5/6/2021    Procedure: Injection-steroid-epidural-cervical;  Surgeon: Tad Dyer MD;  Location: [facility] OR;  Service: Pain Management;  Laterality: N/A;    HYSTERECTOMY      OOPHORECTOMY      TOE SURGERY Right     right big toe ingrown         Have you been fasting for at least 6 hours? Yes    Is there any chance you may be pregnant or breastfeeding? No    Assay: 12.01 MCi@:8.17   Injection Site:rt arm     Residual: 1.14 mCi@: 8.19   Technologist: Aziza Lea Injected:13.2mCi

## 2024-04-10 ENCOUNTER — TELEPHONE (OUTPATIENT)
Dept: HEMATOLOGY/ONCOLOGY | Facility: CLINIC | Age: 74
End: 2024-04-10
Payer: MEDICARE

## 2024-04-10 ENCOUNTER — OFFICE VISIT (OUTPATIENT)
Dept: HEMATOLOGY/ONCOLOGY | Facility: CLINIC | Age: 74
End: 2024-04-10
Payer: MEDICARE

## 2024-04-10 VITALS
HEART RATE: 88 BPM | RESPIRATION RATE: 18 BRPM | SYSTOLIC BLOOD PRESSURE: 140 MMHG | OXYGEN SATURATION: 96 % | BODY MASS INDEX: 33.22 KG/M2 | TEMPERATURE: 98 F | HEIGHT: 61 IN | DIASTOLIC BLOOD PRESSURE: 72 MMHG | WEIGHT: 175.94 LBS

## 2024-04-10 DIAGNOSIS — C82.54 DIFFUSE FOLLICLE CENTER LYMPHOMA OF LYMPH NODES OF AXILLA: Primary | ICD-10-CM

## 2024-04-10 DIAGNOSIS — R92.8 ABNORMAL MAMMOGRAM OF LEFT BREAST: ICD-10-CM

## 2024-04-10 DIAGNOSIS — Z85.3 HISTORY OF BREAST CANCER: ICD-10-CM

## 2024-04-10 PROCEDURE — 3008F BODY MASS INDEX DOCD: CPT | Mod: CPTII,S$GLB,, | Performed by: INTERNAL MEDICINE

## 2024-04-10 PROCEDURE — 3077F SYST BP >= 140 MM HG: CPT | Mod: CPTII,S$GLB,, | Performed by: INTERNAL MEDICINE

## 2024-04-10 PROCEDURE — 3078F DIAST BP <80 MM HG: CPT | Mod: CPTII,S$GLB,, | Performed by: INTERNAL MEDICINE

## 2024-04-10 PROCEDURE — 1159F MED LIST DOCD IN RCRD: CPT | Mod: CPTII,S$GLB,, | Performed by: INTERNAL MEDICINE

## 2024-04-10 PROCEDURE — 3288F FALL RISK ASSESSMENT DOCD: CPT | Mod: CPTII,S$GLB,, | Performed by: INTERNAL MEDICINE

## 2024-04-10 PROCEDURE — 99215 OFFICE O/P EST HI 40 MIN: CPT | Mod: S$GLB,,, | Performed by: INTERNAL MEDICINE

## 2024-04-10 PROCEDURE — 1126F AMNT PAIN NOTED NONE PRSNT: CPT | Mod: CPTII,S$GLB,, | Performed by: INTERNAL MEDICINE

## 2024-04-10 PROCEDURE — 1101F PT FALLS ASSESS-DOCD LE1/YR: CPT | Mod: CPTII,S$GLB,, | Performed by: INTERNAL MEDICINE

## 2024-04-10 PROCEDURE — 4010F ACE/ARB THERAPY RXD/TAKEN: CPT | Mod: CPTII,S$GLB,, | Performed by: INTERNAL MEDICINE

## 2024-04-10 PROCEDURE — 99999 PR PBB SHADOW E&M-EST. PATIENT-LVL IV: CPT | Mod: PBBFAC,,, | Performed by: INTERNAL MEDICINE

## 2024-04-10 PROCEDURE — 1160F RVW MEDS BY RX/DR IN RCRD: CPT | Mod: CPTII,S$GLB,, | Performed by: INTERNAL MEDICINE

## 2024-04-10 NOTE — TELEPHONE ENCOUNTER
Spoke with pt that she does not need labs done today before appt. Pt verbalized understanding.      ----- Message from Sophie Zavala sent at 4/10/2024 11:53 AM CDT -----  Type: Needs Medical Advice  Who Called:  Patient   Symptoms (please be specific):  How long has patient had these symptoms:    Pharmacy name and phone #:    Best Call Back Number: [phone]  Additional Information: Patient is requesting a call back regarding labs before her appt. today.

## 2024-04-10 NOTE — PROGRESS NOTES
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    PREVIOUS HISTORY:  Patient underwent on 2024 a mammogram diagnostic with left deanna an ultrasound of the left breast came in view of her personal history of breast cancer.  She was found to have a left breast mass measuring 6 mm x 4 mm x 6 mm at 2:00 a.m..  There was an abnormal lymph node also noted an ultrasound-guided biopsy was recommended.    She underwent on 2024 a left breast biopsy that showed no tumor seen.  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  The Ki-67 was very low.      Mother had NHL in her late 70's. She had a stage 3. She had chemotherapy and wet into remission. She relapse nichol year. She received further systemic therapy.  First paternal cousin had breast cancer in her 40's    Oncology History    No history exists.        Past Medical History:   Diagnosis Date    Anxiety     Breast cancer     Colon polyps     Depression     Gastro-esophageal reflux     History of chicken pox     Hyperlipidemia     Hypothyroidism      Migraine     Shingles 10/31/2018    seen by urgent care    Usual hyperplasia of lactiferous duct        Past Surgical History:   Procedure Laterality Date    BREAST BIOPSY Left 1998    BREAST BIOPSY Bilateral 1999    BREAST LUMPECTOMY Left 1998    pt had radiation    CARPAL TUNNEL RELEASE      COLONOSCOPY      EPIDURAL STEROID INJECTION INTO CERVICAL SPINE N/A 2021    Procedure: Injection-steroid-epidural-cervical;  Surgeon: Tad Dyer MD;  Location: [facility] OR;  Service: Pain Management;  Laterality: N/A;    HYSTERECTOMY      OOPHORECTOMY      TOE SURGERY Right     right big toe ingrown        Family History   Problem Relation Age of Onset    Lymphoma Mother     Thyroid disease Mother     Hypertension Mother     Diabetes Father     Heart disease Father     Hyperlipidemia Father     Hypertension Father     Stroke Father     Breast cancer Paternal Cousin        Social History     Socioeconomic History    Marital status:      Spouse name: Fredrick    Number of children: 3   Tobacco Use    Smoking status: Former     Current packs/day: 0.00     Types: Cigarettes     Quit date: 1976     Years since quittin.3    Smokeless tobacco: Never   Substance and Sexual Activity    Alcohol use: Yes     Alcohol/week: 1.0 standard drink of alcohol     Types: 1 Glasses of wine per week     Comment: rarely    Drug use: No    Sexual activity: Yes     Partners: Male     Social Determinants of Health     Financial Resource Strain: Low Risk  (11/10/2023)    Overall Financial Resource Strain (CARDIA)     Difficulty of Paying Living Expenses: Not hard at all   Food Insecurity: No Food Insecurity (11/10/2023)    Hunger Vital Sign     Worried About Running Out of Food in the Last Year: Never true     Ran Out of Food in the Last Year: Never true   Transportation Needs: No Transportation Needs (11/10/2023)    PRAPARE - Transportation     Lack of Transportation (Medical): No     Lack of Transportation (Non-Medical): No  "  Physical Activity: Inactive (11/10/2023)    Exercise Vital Sign     Days of Exercise per Week: 0 days     Minutes of Exercise per Session: 0 min   Stress: Stress Concern Present (11/10/2023)    [employer] [place] [employer] - Occupational Stress Questionnaire     Feeling of Stress : To some extent   Social Connections: Unknown (11/10/2023)    Social Connection and Isolation Panel [NHANES]     Frequency of Communication with Friends and Family: More than three times a week     Frequency of Social Gatherings with Friends and Family: More than three times a week     Active Member of Clubs or Organizations: No     Attends Club or Organization Meetings: Never     Marital Status:    Housing Stability: Unknown (11/10/2023)    Housing Stability Vital Sign     Unable to Pay for Housing in the Last Year: No     Unstable Housing in the Last Year: No       Review of patient's allergies indicates:   Allergen Reactions    Ciprofloxacin      Stomach pain      Sulfa (sulfonamide antibiotics)      Stomach      ROS:  Answers submitted by the patient for this visit:  Review of Systems Questionnaire (Submitted on 4/3/2024)  appetite change : No  unexpected weight change: No  mouth sores: No  visual disturbance: No  cough: No  shortness of breath: No  chest pain: No  abdominal pain: No  diarrhea: No  frequency: No  back pain: No  rash: No  headaches: No  adenopathy: No  nervous/ anxious: Yes           Objective:     Vitals:    04/10/24 1440   BP: (!) 140/72   Pulse: 88   Resp: 18   Temp: 97.8 °F (36.6 °C)   TempSrc: Temporal   SpO2: 96%   Weight: 79.8 kg (175 lb 14.8 oz)   Height: 5' 1" (1.549 m)          Physical Exam  Vitals reviewed.   Constitutional:       Appearance: Normal appearance.   Eyes:      General: No scleral icterus.     Pupils: Pupils are equal, round, and reactive to light.   Cardiovascular:      Rate and Rhythm: Normal rate and regular rhythm.      Pulses: Normal pulses.      Heart sounds: Normal " heart sounds.   Pulmonary:      Effort: Pulmonary effort is normal.      Breath sounds: Normal breath sounds.   Abdominal:      General: Bowel sounds are normal. There is no distension.   Musculoskeletal:         General: No swelling.   Lymphadenopathy:      Cervical: No cervical adenopathy.   Skin:     General: Skin is warm.      Findings: No rash.   Neurological:      General: No focal deficit present.      Mental Status: She is alert.   Psychiatric:         Mood and Affect: Mood normal.                Current Outpatient Medications on File Prior to Visit   Medication Sig    amLODIPine (NORVASC) 10 MG tablet Take 1 tablet (10 mg total) by mouth once daily.    hydroCHLOROthiazide (HYDRODIURIL) 12.5 MG Tab Take 1 tablet (12.5 mg total) by mouth once daily.    levothyroxine (SYNTHROID) 75 MCG tablet TAKE 1 TABLET (75 MCG TOTAL) BY MOUTH ONCE DAILY.    lisinopriL (PRINIVIL,ZESTRIL) 40 MG tablet Take 1 tablet (40 mg total) by mouth once daily.    LORazepam (ATIVAN) 1 MG tablet TAKE 1 TABLET BY MOUTH TWICE DAILY AS NEEDED FOR ANXIETY    omeprazole (PRILOSEC) 40 MG capsule Take 1 capsule (40 mg total) by mouth every morning.    rosuvastatin (CRESTOR) 5 MG tablet Take 1 tablet (5 mg total) by mouth once daily.    sertraline (ZOLOFT) 100 MG tablet TAKE 1 TABLET (100 MG TOTAL) BY MOUTH ONCE DAILY.    solifenacin (VESICARE) 5 MG tablet Take 5 mg by mouth.     Current Facility-Administered Medications on File Prior to Visit   Medication    LIDOcaine-EPINEPHrine (PF) 1%-1:200,000 injection 20 mL       CBC:  Lab Results   Component Value Date    WBC 8.44 03/18/2024    HGB 14.7 03/18/2024    HCT 40.8 03/18/2024    MCV 82 03/18/2024     03/18/2024         CMP:  Sodium   Date Value Ref Range Status   03/18/2024 139 136 - 145 mmol/L Final     Potassium   Date Value Ref Range Status   03/18/2024 3.8 3.5 - 5.1 mmol/L Final     Chloride   Date Value Ref Range Status   03/18/2024 101 95 - 110 mmol/L Final     CO2   Date Value  Ref Range Status   03/18/2024 27 23 - 29 mmol/L Final     Glucose   Date Value Ref Range Status   03/18/2024 148 (H) 70 - 110 mg/dL Final     BUN   Date Value Ref Range Status   03/18/2024 18 8 - 23 mg/dL Final     Creatinine   Date Value Ref Range Status   03/18/2024 0.9 0.5 - 1.4 mg/dL Final     Calcium   Date Value Ref Range Status   03/18/2024 10.2 8.7 - 10.5 mg/dL Final     Total Protein   Date Value Ref Range Status   03/18/2024 7.5 6.0 - 8.4 g/dL Final     Albumin   Date Value Ref Range Status   03/18/2024 4.6 3.5 - 5.2 g/dL Final     Total Bilirubin   Date Value Ref Range Status   03/18/2024 0.4 0.1 - 1.0 mg/dL Final     Comment:     For infants and newborns, interpretation of results should be based  on gestational age, weight and in agreement with clinical  observations.    Premature Infant recommended reference ranges:  Up to 24 hours.............<8.0 mg/dL  Up to 48 hours............<12.0 mg/dL  3-5 days..................<15.0 mg/dL  6-29 days.................<15.0 mg/dL       Alkaline Phosphatase   Date Value Ref Range Status   03/18/2024 49 (L) 55 - 135 U/L Final     AST ([place])   Date Value Ref Range Status   03/08/2016 28 14 - 36 U/L Final     AST   Date Value Ref Range Status   03/18/2024 32 10 - 40 U/L Final     ALT   Date Value Ref Range Status   03/18/2024 51 (H) 10 - 44 U/L Final     Anion Gap   Date Value Ref Range Status   03/18/2024 11 8 - 16 mmol/L Final     eGFR if    Date Value Ref Range Status   03/14/2022 >60 >60 mL/min/1.73 m^2 Final     eGFR if non    Date Value Ref Range Status   03/14/2022 >60 >60 mL/min/1.73 m^2 Final     Comment:     Calculation used to obtain the estimated glomerular filtration  rate (eGFR) is the CKD-EPI equation.          NM PET CT FDG Skull Base to Mid Thigh  Narrative: EXAMINATION:  NM PET CT FDG SKULL BASE TO MID THIGH    CLINICAL HISTORY:  Hematologic malignancy, staging;Follicular lymphoma;  Follicular lymphoma grade  ii, lymph nodes of axilla and upper limb    73-year-old female with follicular lymphoma arising in left axillary lymph nodes.    TECHNIQUE:  Following IV injection of 13.2 mCi F-18 FDG, 3D PET imaging was performed from the skull base to the mid thighs.  A noncontrast non breath hold CT was obtained in conjunction with the PET scan for attenuation correction and anatomic correlation.  PET-CT fusion images were generated.    COMPARISON:  None    FINDINGS:  Mediastinal blood pool max SUV: 2.7    Normal hepatic parenchyma max SUV: 2.2    Head/neck:    No significant abnormal hypermetabolic foci are identified within the head and neck.  No lymphadenopathy is present.    Chest:    Nonenlarged and mildly enlarged lymph nodes are present within the left axilla.  One lymph node contains a biopsy clip.  This lymph node on image 70 measures 1.9 x 0.9 cm and is poorly metabolic with a max SUV of 2.4.  Another node on image 60 measures 1.7 x 1.2 cm and is mildly hypermetabolic with a max SUV of 3.1.  No hypermetabolic pulmonary nodules or pleural effusion are present.  No mediastinal or hilar lymphadenopathy is present.    Abdomen/pelvis:    No significant abnormal hypermetabolic foci are identified within the abdomen and pelvis.  No lymphadenopathy is present.  There is mild splenomegaly.  The spleen measures 14.3 x 9.2 x 8.6 cm and has a max SUV of 2.7 which is not above mediastinal blood pool.  The max SUV spleen/liver ratio is 1.2 which is an indicator of future favorable response to therapy should therapy would be warranted.  Incidental note is made of extensive hepatic steatosis.    Skeleton:    No significant abnormal hypermetabolic foci are identified within the skeleton.  There are no findings to suggest osseous neoplastic disease.  Impression: 1. Multiple left axillary lymph nodes.  One lymph node is mildly hypermetabolic and another lymph node which contains a biopsy clip is poorly metabolic.  2. Mild  "splenomegaly.  3. Max SUV spleen/liver ratio of 1.2.  See above discussion.    Electronically signed by: Wilfredo Marin MD  Date:    03/21/2024  Time:    11:28       ECOG SCORE                  Assessment/Plan:       Follicular lymphoma grade 1-2.    I reviewed independently the patient medical record including her laboratory radiologic and pathologic findings.  I discussed with her the results of her biopsy and  the natural biology of her disease.  This is an indolent non-Hodgkin's lymphoma that waxes and wanes.  This is a treatable condition but not curable.   She will require treatment if there is evidence of end-organ damage that include but not restricted to fever chills night sweats unintentional weight loss abnormal counts recurrent infections or symptomatic pain due to enlarged lymph node.  PET scan for staging purposes done on March 21, 2024 showed multiple left axillary lymph node were noted.  Maximum SUV was 3.1.  There was a mild splenomegaly noted with an SUV of 2.7.  No other abnormality was noted. .    blood workup drawn on March 18, 2024 showed a normal CBC CMP ESR LDH uric acid HIV acute hepatitis panel  Since the patient is asymptomatic she will continue to be followed clinically with repeat labs.  She will be seen again in 4 months with repeat labs  CBC CMP ESR LDH       History of breast cancer - DCIS  1996. Lumpectomy and RT and Tamoxifen for 5 years  In view of her history of 2 malignancy the patient will benefit from being evaluated by genetic counselor.      Abnormal mammogram of the left breast:  March 6, 2024: "2:00 mass could represent fat necrosis based on imaging appearance, this remains concerning as it is new with no history of trauma and its proximity to the lumpectomy site. Surgical consultation is recommended. "  Patient is due to have an excisional biopsy in April 25, 2024.             Med Onc Chart Routing      Follow up with physician 4 months. with repeat CBC CMP ESR LDH "   Follow up with MOMO    Infusion scheduling note    Injection scheduling note    Labs    Imaging    Pharmacy appointment    Other referrals                   Plan was discussed with the patient at length, and she verbalized understanding. Xiao was given an opportunity to ask questions that were answered to her satisfaction, and she was advised to call in the interval if any problems or questions arise.  Signed:  Christine Juárez MD   Hematology and Oncology  [facility] [address] [facility] [place]

## 2024-05-13 PROBLEM — F33.1 MAJOR DEPRESSIVE DISORDER, RECURRENT, MODERATE: Status: RESOLVED | Noted: 2023-09-18 | Resolved: 2024-05-13

## 2024-08-14 ENCOUNTER — LAB VISIT (OUTPATIENT)
Dept: LAB | Facility: HOSPITAL | Age: 74
End: 2024-08-14
Attending: INTERNAL MEDICINE
Payer: MEDICARE

## 2024-08-14 ENCOUNTER — PATIENT MESSAGE (OUTPATIENT)
Dept: HEMATOLOGY/ONCOLOGY | Facility: CLINIC | Age: 74
End: 2024-08-14
Payer: MEDICARE

## 2024-08-14 ENCOUNTER — TELEPHONE (OUTPATIENT)
Dept: HEMATOLOGY/ONCOLOGY | Facility: CLINIC | Age: 74
End: 2024-08-14
Payer: MEDICARE

## 2024-08-14 ENCOUNTER — OFFICE VISIT (OUTPATIENT)
Dept: HEMATOLOGY/ONCOLOGY | Facility: CLINIC | Age: 74
End: 2024-08-14
Payer: MEDICARE

## 2024-08-14 VITALS
DIASTOLIC BLOOD PRESSURE: 67 MMHG | SYSTOLIC BLOOD PRESSURE: 138 MMHG | TEMPERATURE: 98 F | HEIGHT: 61 IN | WEIGHT: 174.94 LBS | BODY MASS INDEX: 33.03 KG/M2 | HEART RATE: 73 BPM | OXYGEN SATURATION: 95 % | RESPIRATION RATE: 16 BRPM

## 2024-08-14 DIAGNOSIS — R92.8 ABNORMAL MAMMOGRAM OF LEFT BREAST: ICD-10-CM

## 2024-08-14 DIAGNOSIS — Z85.3 HISTORY OF BREAST CANCER: ICD-10-CM

## 2024-08-14 DIAGNOSIS — E66.09 CLASS 1 OBESITY DUE TO EXCESS CALORIES WITHOUT SERIOUS COMORBIDITY WITH BODY MASS INDEX (BMI) OF 33.0 TO 33.9 IN ADULT: ICD-10-CM

## 2024-08-14 DIAGNOSIS — C82.14 GRADE 2 FOLLICULAR LYMPHOMA OF LYMPH NODES OF AXILLA: Primary | ICD-10-CM

## 2024-08-14 DIAGNOSIS — C82.54 DIFFUSE FOLLICLE CENTER LYMPHOMA OF LYMPH NODES OF AXILLA: ICD-10-CM

## 2024-08-14 PROBLEM — E66.811 CLASS 1 OBESITY DUE TO EXCESS CALORIES WITHOUT SERIOUS COMORBIDITY WITH BODY MASS INDEX (BMI) OF 33.0 TO 33.9 IN ADULT: Status: ACTIVE | Noted: 2024-08-14

## 2024-08-14 LAB
ALBUMIN SERPL BCP-MCNC: 4.7 G/DL (ref 3.5–5.2)
ALP SERPL-CCNC: 61 U/L (ref 55–135)
ALT SERPL W/O P-5'-P-CCNC: 55 U/L (ref 10–44)
ANION GAP SERPL CALC-SCNC: 16 MMOL/L (ref 8–16)
AST SERPL-CCNC: 35 U/L (ref 10–40)
BASOPHILS # BLD AUTO: 0.05 K/UL (ref 0–0.2)
BASOPHILS NFR BLD: 0.7 % (ref 0–1.9)
BILIRUB SERPL-MCNC: 0.5 MG/DL (ref 0.1–1)
BUN SERPL-MCNC: 15 MG/DL (ref 8–23)
CALCIUM SERPL-MCNC: 10.5 MG/DL (ref 8.7–10.5)
CHLORIDE SERPL-SCNC: 101 MMOL/L (ref 95–110)
CO2 SERPL-SCNC: 25 MMOL/L (ref 23–29)
CREAT SERPL-MCNC: 1.1 MG/DL (ref 0.5–1.4)
DIFFERENTIAL METHOD BLD: NORMAL
EOSINOPHIL # BLD AUTO: 0.1 K/UL (ref 0–0.5)
EOSINOPHIL NFR BLD: 1.7 % (ref 0–8)
ERYTHROCYTE [DISTWIDTH] IN BLOOD BY AUTOMATED COUNT: 12.6 % (ref 11.5–14.5)
ERYTHROCYTE [SEDIMENTATION RATE] IN BLOOD BY PHOTOMETRIC METHOD: 15 MM/HR (ref 0–36)
EST. GFR  (NO RACE VARIABLE): 52.7 ML/MIN/1.73 M^2
GLUCOSE SERPL-MCNC: 179 MG/DL (ref 70–110)
HCT VFR BLD AUTO: 41 % (ref 37–48.5)
HGB BLD-MCNC: 14.4 G/DL (ref 12–16)
IMM GRANULOCYTES # BLD AUTO: 0.02 K/UL (ref 0–0.04)
IMM GRANULOCYTES NFR BLD AUTO: 0.3 % (ref 0–0.5)
LDH SERPL L TO P-CCNC: 219 U/L (ref 110–260)
LYMPHOCYTES # BLD AUTO: 2 K/UL (ref 1–4.8)
LYMPHOCYTES NFR BLD: 28.2 % (ref 18–48)
MCH RBC QN AUTO: 29.3 PG (ref 27–31)
MCHC RBC AUTO-ENTMCNC: 35.1 G/DL (ref 32–36)
MCV RBC AUTO: 83 FL (ref 82–98)
MONOCYTES # BLD AUTO: 0.4 K/UL (ref 0.3–1)
MONOCYTES NFR BLD: 5 % (ref 4–15)
NEUTROPHILS # BLD AUTO: 4.6 K/UL (ref 1.8–7.7)
NEUTROPHILS NFR BLD: 64.1 % (ref 38–73)
NRBC BLD-RTO: 0 /100 WBC
PLATELET # BLD AUTO: 229 K/UL (ref 150–450)
PMV BLD AUTO: 10.3 FL (ref 9.2–12.9)
POTASSIUM SERPL-SCNC: 3.8 MMOL/L (ref 3.5–5.1)
PROT SERPL-MCNC: 7.9 G/DL (ref 6–8.4)
RBC # BLD AUTO: 4.92 M/UL (ref 4–5.4)
SODIUM SERPL-SCNC: 142 MMOL/L (ref 136–145)
WBC # BLD AUTO: 7.19 K/UL (ref 3.9–12.7)

## 2024-08-14 PROCEDURE — 3044F HG A1C LEVEL LT 7.0%: CPT | Mod: CPTII,S$GLB,, | Performed by: INTERNAL MEDICINE

## 2024-08-14 PROCEDURE — 3075F SYST BP GE 130 - 139MM HG: CPT | Mod: CPTII,S$GLB,, | Performed by: INTERNAL MEDICINE

## 2024-08-14 PROCEDURE — 99999 PR PBB SHADOW E&M-EST. PATIENT-LVL IV: CPT | Mod: PBBFAC,,, | Performed by: INTERNAL MEDICINE

## 2024-08-14 PROCEDURE — 83615 LACTATE (LD) (LDH) ENZYME: CPT | Mod: PN | Performed by: INTERNAL MEDICINE

## 2024-08-14 PROCEDURE — 1159F MED LIST DOCD IN RCRD: CPT | Mod: CPTII,S$GLB,, | Performed by: INTERNAL MEDICINE

## 2024-08-14 PROCEDURE — 3288F FALL RISK ASSESSMENT DOCD: CPT | Mod: CPTII,S$GLB,, | Performed by: INTERNAL MEDICINE

## 2024-08-14 PROCEDURE — 36415 COLL VENOUS BLD VENIPUNCTURE: CPT | Mod: PN | Performed by: INTERNAL MEDICINE

## 2024-08-14 PROCEDURE — 4010F ACE/ARB THERAPY RXD/TAKEN: CPT | Mod: CPTII,S$GLB,, | Performed by: INTERNAL MEDICINE

## 2024-08-14 PROCEDURE — 1160F RVW MEDS BY RX/DR IN RCRD: CPT | Mod: CPTII,S$GLB,, | Performed by: INTERNAL MEDICINE

## 2024-08-14 PROCEDURE — 3078F DIAST BP <80 MM HG: CPT | Mod: CPTII,S$GLB,, | Performed by: INTERNAL MEDICINE

## 2024-08-14 PROCEDURE — 1126F AMNT PAIN NOTED NONE PRSNT: CPT | Mod: CPTII,S$GLB,, | Performed by: INTERNAL MEDICINE

## 2024-08-14 PROCEDURE — G2211 COMPLEX E/M VISIT ADD ON: HCPCS | Mod: S$GLB,,, | Performed by: INTERNAL MEDICINE

## 2024-08-14 PROCEDURE — 85652 RBC SED RATE AUTOMATED: CPT | Performed by: INTERNAL MEDICINE

## 2024-08-14 PROCEDURE — 99214 OFFICE O/P EST MOD 30 MIN: CPT | Mod: S$GLB,,, | Performed by: INTERNAL MEDICINE

## 2024-08-14 PROCEDURE — 80053 COMPREHEN METABOLIC PANEL: CPT | Mod: PN | Performed by: INTERNAL MEDICINE

## 2024-08-14 PROCEDURE — 3008F BODY MASS INDEX DOCD: CPT | Mod: CPTII,S$GLB,, | Performed by: INTERNAL MEDICINE

## 2024-08-14 PROCEDURE — 1101F PT FALLS ASSESS-DOCD LE1/YR: CPT | Mod: CPTII,S$GLB,, | Performed by: INTERNAL MEDICINE

## 2024-08-14 PROCEDURE — 85025 COMPLETE CBC W/AUTO DIFF WBC: CPT | Mod: PN | Performed by: INTERNAL MEDICINE

## 2024-08-14 NOTE — TELEPHONE ENCOUNTER
Called patient and left message to call back to set up a genetic appointment and sent a portal message.

## 2024-08-14 NOTE — PROGRESS NOTES
Subjective:       Name: Xiao Fuentes  : 1950  MRN: [id number]    Chief Complaint   Patient presents with    Follow-up     Follicular lymphoma        Patient is in clinic byherself.    HPI: Xiao Fuentes is a 74 y.o. female presents for evaluation of her newly diagnosed with Follow-up (Follicular lymphoma)      The patient denies any fever chills night sweats unintentional weight loss nausea vomiting abdominal pain cough phlegm melena hematochezia or hematemesis.    PREVIOUS HISTORY:  Patient underwent on 2024 a mammogram diagnostic with left deanna an ultrasound of the left breast came in view of her personal history of breast cancer.  She was found to have a left breast mass measuring 6 mm x 4 mm x 6 mm at 2:00 a.m..  There was an abnormal lymph node also noted an ultrasound-guided biopsy was recommended.    She underwent on 2024 a left breast biopsy that showed no tumor seen.  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  The Ki-67 was very low.      Mother had NHL in her late 70's. She had a stage 3. She had chemotherapy and wet into remission. She relapse nichol year. She received further systemic therapy.  First paternal cousin had breast cancer in her 40's    Oncology History    No history exists.        Past Medical History:   Diagnosis Date    Anxiety     Breast cancer     Colon polyps     Depression     Gastro-esophageal reflux     History of chicken pox     Hyperlipidemia     Hypertension     Hypothyroidism     Migraine     Prediabetes     Shingles 10/31/2018    seen by urgent care    Usual hyperplasia of lactiferous duct        Past Surgical History:   Procedure Laterality Date    BIOPSY, BREAST, WITH RADAR LOCALIZATION USING DIMPLE  Left 2024    Procedure: BIOPSY, BREAST, WITH RADAR LOCALIZATION USING DIMPLE ;  Surgeon: Fior Juan MD;  Location: [facility];  Service: General;  Laterality: Left;     BREAST BIOPSY Left     BREAST BIOPSY Bilateral     BREAST LUMPECTOMY Left     pt had radiation    CARPAL TUNNEL RELEASE      COLONOSCOPY      CYSTOSCOPY      EPIDURAL STEROID INJECTION INTO CERVICAL SPINE N/A 2021    Procedure: Injection-steroid-epidural-cervical;  Surgeon: Tad Dyer MD;  Location: [facility] OR;  Service: Pain Management;  Laterality: N/A;    HYSTERECTOMY      OOPHORECTOMY      TOE SURGERY Right     right big toe ingrown     TUBAL LIGATION         Family History   Problem Relation Name Age of Onset    Lymphoma Mother Mom     Thyroid disease Mother Mom     Hypertension Mother Mom     Cancer Mother Mom     Diabetes Father Dad     Heart disease Father Dad     Hyperlipidemia Father Dad     Hypertension Father Dad     Stroke Father Dad     Breast cancer Paternal Cousin      Mental illness Brother Tobias        Social History     Socioeconomic History    Marital status:      Spouse name: Fredrick    Number of children: 3   Tobacco Use    Smoking status: Former     Current packs/day: 0.00     Types: Cigarettes     Quit date: 1976     Years since quittin.6    Smokeless tobacco: Never   Substance and Sexual Activity    Alcohol use: Yes     Alcohol/week: 1.0 standard drink of alcohol     Types: 1 Glasses of wine per week     Comment: rarely    Drug use: No    Sexual activity: Yes     Partners: Male     Social Determinants of Health     Financial Resource Strain: Low Risk  (11/10/2023)    Overall Financial Resource Strain (CARDIA)     Difficulty of Paying Living Expenses: Not hard at all   Food Insecurity: No Food Insecurity (11/10/2023)    Hunger Vital Sign     Worried About Running Out of Food in the Last Year: Never true     Ran Out of Food in the Last Year: Never true   Transportation Needs: No Transportation Needs (11/10/2023)    PRAPARE - Transportation     Lack of Transportation (Medical): No     Lack of Transportation (Non-Medical): No   Physical Activity: Inactive  "(11/10/2023)    Exercise Vital Sign     Days of Exercise per Week: 0 days     Minutes of Exercise per Session: 0 min   Stress: Stress Concern Present (11/10/2023)    [employer] - Occupational Stress Questionnaire     Feeling of Stress : To some extent   Housing Stability: Unknown (11/10/2023)    Housing Stability Vital Sign     Unable to Pay for Housing in the Last Year: No     Unstable Housing in the Last Year: No       Review of patient's allergies indicates:   Allergen Reactions    Ciprofloxacin      Stomach pain      Sulfa (sulfonamide antibiotics)      Stomach      ROS:  Review of Systems Questionnaire (Submitted on 8/9/2024)  appetite change : No  unexpected weight change: No  mouth sores: No  visual disturbance: No  cough: No  shortness of breath: No  chest pain: No  abdominal pain: No  diarrhea: No  frequency: No  back pain: No  rash: No  headaches: No  adenopathy: No  nervous/ anxious: No           Objective:     Vitals:    08/14/24 1331   BP: 138/67   BP Location: Left arm   Patient Position: Sitting   BP Method: Medium (Automatic)   Pulse: 73   Resp: 16   Temp: 97.9 °F (36.6 °C)   SpO2: 95%   Weight: 79.4 kg (174 lb 15.3 oz)   Height: 5' 1" (1.549 m)          Physical Exam  Vitals reviewed.   Constitutional:       Appearance: Normal appearance.   Eyes:      General: No scleral icterus.     Pupils: Pupils are equal, round, and reactive to light.   Cardiovascular:      Rate and Rhythm: Normal rate and regular rhythm.      Pulses: Normal pulses.      Heart sounds: Normal heart sounds.   Pulmonary:      Effort: Pulmonary effort is normal.      Breath sounds: Normal breath sounds.   Abdominal:      General: Bowel sounds are normal. There is no distension.   Musculoskeletal:         General: No swelling.   Lymphadenopathy:      Cervical: No cervical adenopathy.   Skin:     General: Skin is warm.      Findings: No rash.   Neurological:      General: No focal deficit present.      " Mental Status: She is alert.   Psychiatric:         Mood and Affect: Mood normal.                Current Outpatient Medications on File Prior to Visit   Medication Sig    amLODIPine (NORVASC) 10 MG tablet TAKE 1 TABLET BY MOUTH EVERY DAY    hydroCHLOROthiazide (HYDRODIURIL) 12.5 MG Tab Take 1 tablet (12.5 mg total) by mouth once daily.    levothyroxine (SYNTHROID) 75 MCG tablet TAKE 1 TABLET (75 MCG TOTAL) BY MOUTH ONCE DAILY.    lisinopriL (PRINIVIL,ZESTRIL) 40 MG tablet Take 1 tablet (40 mg total) by mouth once daily.    LORazepam (ATIVAN) 1 MG tablet TAKE 1 TABLET BY MOUTH TWICE DAILY AS NEEDED FOR ANXIETY    omeprazole (PRILOSEC) 40 MG capsule Take 1 capsule (40 mg total) by mouth every morning.    rosuvastatin (CRESTOR) 5 MG tablet Take 1 tablet (5 mg total) by mouth once daily.    sertraline (ZOLOFT) 100 MG tablet TAKE 1 TABLET (100 MG TOTAL) BY MOUTH ONCE DAILY.    solifenacin (VESICARE) 5 MG tablet Take 5 mg by mouth daily as needed.     Current Facility-Administered Medications on File Prior to Visit   Medication    chlorhexidine 0.12 % solution 15 mL    dextrose 50% injection 12.5 g    dextrose 50% injection 25 g    insulin regular injection 0-8 Units    lactated ringers infusion    LIDOcaine-EPINEPHrine (PF) 1%-1:200,000 injection 20 mL    mupirocin 2 % ointment       CBC:  Lab Results   Component Value Date    WBC 7.19 08/14/2024    HGB 14.4 08/14/2024    HCT 41.0 08/14/2024    MCV 83 08/14/2024     08/14/2024         CMP:  Sodium   Date Value Ref Range Status   08/14/2024 142 136 - 145 mmol/L Final     Potassium   Date Value Ref Range Status   08/14/2024 3.8 3.5 - 5.1 mmol/L Final     Chloride   Date Value Ref Range Status   08/14/2024 101 95 - 110 mmol/L Final     CO2   Date Value Ref Range Status   08/14/2024 25 23 - 29 mmol/L Final     Glucose   Date Value Ref Range Status   08/14/2024 179 (H) 70 - 110 mg/dL Final     BUN   Date Value Ref Range Status   08/14/2024 15 8 - 23 mg/dL Final      Creatinine   Date Value Ref Range Status   08/14/2024 1.1 0.5 - 1.4 mg/dL Final     Calcium   Date Value Ref Range Status   08/14/2024 10.5 8.7 - 10.5 mg/dL Final     Total Protein   Date Value Ref Range Status   08/14/2024 7.9 6.0 - 8.4 g/dL Final     Albumin   Date Value Ref Range Status   08/14/2024 4.7 3.5 - 5.2 g/dL Final     Total Bilirubin   Date Value Ref Range Status   08/14/2024 0.5 0.1 - 1.0 mg/dL Final     Comment:     For infants and newborns, interpretation of results should be based  on gestational age, weight and in agreement with clinical  observations.    Premature Infant recommended reference ranges:  Up to 24 hours.............<8.0 mg/dL  Up to 48 hours............<12.0 mg/dL  3-5 days..................<15.0 mg/dL  6-29 days.................<15.0 mg/dL       Alkaline Phosphatase   Date Value Ref Range Status   08/14/2024 61 55 - 135 U/L Final     AST ([place])   Date Value Ref Range Status   03/08/2016 28 14 - 36 U/L Final     AST   Date Value Ref Range Status   08/14/2024 35 10 - 40 U/L Final     ALT   Date Value Ref Range Status   08/14/2024 55 (H) 10 - 44 U/L Final     Anion Gap   Date Value Ref Range Status   08/14/2024 16 8 - 16 mmol/L Final     eGFR if    Date Value Ref Range Status   03/14/2022 >60 >60 mL/min/1.73 m^2 Final     eGFR if non    Date Value Ref Range Status   03/14/2022 >60 >60 mL/min/1.73 m^2 Final     Comment:     Calculation used to obtain the estimated glomerular filtration  rate (eGFR) is the CKD-EPI equation.          Mammo Breast Specimen  SPECIMEN RADIOGRAPH    A single faxitron specimen image was presented for interpretation.    FINDINGS/IMPRESSION: There is a heart shaped biopsy clip and a radar   reflector in the specimen.       ECOG SCORE    0 - Fully active-able to carry on all pre-disease performance without restriction              Assessment/Plan:       Follicular lymphoma grade 1-2.    I reviewed independently the  "patient medical record including her laboratory radiologic and pathologic findings.  I discussed with her the results of her biopsy and  the natural biology of her disease.  This is an indolent non-Hodgkin's lymphoma that waxes and wanes.  This is a treatable condition but not curable.   She will require treatment if there is evidence of end-organ damage that include but not restricted to fever chills night sweats unintentional weight loss abnormal counts recurrent infections or symptomatic pain due to enlarged lymph node.  PET scan for staging purposes done on March 21, 2024 showed multiple left axillary lymph node were noted.  Maximum SUV was 3.1.  There was a mild splenomegaly noted with an SUV of 2.7.  No other abnormality was noted. .    blood workup drawn on March 18, 2024 showed a normal CBC CMP ESR LDH uric acid HIV acute hepatitis panel  -labs drawn on August 14, 2024 for CBC CMP ESR LDH were WNL  Since the patient is asymptomatic she will continue to be followed clinically with repeat labs.  She will be seen again in 4 months with repeat labs  CBC CMP ESR LDH       History of breast cancer - DCIS  1996. Lumpectomy and RT and Tamoxifen for 5 years  In view of her history of 2 malignancy the patient will benefit from being evaluated by genetic counselor.      Abnormal mammogram of the left breast:  March 6, 2024: "2:00 mass could represent fat necrosis based on imaging appearance, this remains concerning as it is new with no history of trauma and its proximity to the lumpectomy site. Surgical consultation is recommended. "  Excisional biopsy in April 25, 2024 showed a fibroadenoma.    Obesity BMI 33.06  -A higher BMI and/or perimenopausal weight gain have been consistently associated with a higher risk of breast cancer among postmenopausal women.The association between a higher BMI and postmenopausal breast cancer risk may be mediated by higher estrogen levels resulting from the peripheral conversion of " estrogen precursors (from adipose tissue) to estrogen. Overweight, Obesity, and Postmenopausal Invasive Breast Cancer Risk: A Secondary Analysis of the Women's Health Initiative Randomized Clinical Trials.  ADELIA Oncol. 2015;1(5):611.   In I discussed these findings the patient was advised to exercise maintain healthy lifestyle and to lose weight.              Med Onc Chart Routing      Follow up with physician 4 months. with repeat CBC CMP ESR LDH. Referral to genetics   Follow up with MOMO    Infusion scheduling note    Injection scheduling note    Labs    Imaging    Pharmacy appointment    Other referrals                   Plan was discussed with the patient at length, and she verbalized understanding. Xiao was given an opportunity to ask questions that were answered to her satisfaction, and she was advised to call in the interval if any problems or questions arise.  Signed:  Christine Juárez MD   Hematology and Oncology  [facility] [address] [facility] [place]

## 2024-08-19 ENCOUNTER — PATIENT MESSAGE (OUTPATIENT)
Dept: HEMATOLOGY/ONCOLOGY | Facility: CLINIC | Age: 74
End: 2024-08-19
Payer: MEDICARE

## 2024-08-19 ENCOUNTER — TELEPHONE (OUTPATIENT)
Dept: HEMATOLOGY/ONCOLOGY | Facility: CLINIC | Age: 74
End: 2024-08-19
Payer: MEDICARE

## 2024-08-19 NOTE — TELEPHONE ENCOUNTER
Called patient and left message to please call back to set up a genetic appointment and sent a portal message.

## 2024-08-20 ENCOUNTER — TELEPHONE (OUTPATIENT)
Dept: HEMATOLOGY/ONCOLOGY | Facility: CLINIC | Age: 74
End: 2024-08-20
Payer: MEDICARE

## 2024-08-20 NOTE — TELEPHONE ENCOUNTER
"-called patient back and left message to confirm she does not want to schedule a genetics appointment. --- Message from Elier Belcher sent at 8/20/2024 10:21 AM CDT -----  Consult/Advisory    Name Of Caller: Self    Contact Preference?: [phone]     Provider Name: Franck    Does patient feel the need to be seen today? No    What is the nature of the call?: Returning call to office. Stating she is not interested in scheduling genetics appt    Additional Notes:  "Thank you for all that you do for our patients"  "

## 2024-08-20 NOTE — TELEPHONE ENCOUNTER
Called patient back no answer----- Message from Frances Arenas sent at 8/20/2024 10:37 AM CDT -----  Regarding: returning missed call  Contact: Pt @ [phone]  Pt is returning a missed call from someone in the office and is asking for a return call back soon. Thanks.         Reason for call: pt advised she does not want to do genetic testing

## 2024-12-16 ENCOUNTER — OFFICE VISIT (OUTPATIENT)
Dept: HEMATOLOGY/ONCOLOGY | Facility: CLINIC | Age: 74
End: 2024-12-16
Payer: MEDICARE

## 2024-12-16 ENCOUNTER — DOCUMENTATION ONLY (OUTPATIENT)
Dept: HEMATOLOGY/ONCOLOGY | Facility: CLINIC | Age: 74
End: 2024-12-16
Payer: MEDICARE

## 2024-12-16 ENCOUNTER — LAB VISIT (OUTPATIENT)
Dept: LAB | Facility: HOSPITAL | Age: 74
End: 2024-12-16
Attending: INTERNAL MEDICINE
Payer: MEDICARE

## 2024-12-16 VITALS
WEIGHT: 164.88 LBS | DIASTOLIC BLOOD PRESSURE: 72 MMHG | HEART RATE: 76 BPM | TEMPERATURE: 97 F | BODY MASS INDEX: 31.13 KG/M2 | RESPIRATION RATE: 16 BRPM | HEIGHT: 61 IN | SYSTOLIC BLOOD PRESSURE: 121 MMHG | OXYGEN SATURATION: 94 %

## 2024-12-16 DIAGNOSIS — Z85.3 HISTORY OF BREAST CANCER: ICD-10-CM

## 2024-12-16 DIAGNOSIS — C82.14 GRADE 2 FOLLICULAR LYMPHOMA OF LYMPH NODES OF AXILLA: ICD-10-CM

## 2024-12-16 DIAGNOSIS — E11.9 NEW ONSET TYPE 2 DIABETES MELLITUS: ICD-10-CM

## 2024-12-16 DIAGNOSIS — E66.9 OBESITY (BMI 30-39.9): ICD-10-CM

## 2024-12-16 DIAGNOSIS — C82.54 DIFFUSE FOLLICLE CENTER LYMPHOMA OF LYMPH NODES OF AXILLA: Primary | ICD-10-CM

## 2024-12-16 DIAGNOSIS — R92.8 ABNORMAL MAMMOGRAM OF LEFT BREAST: ICD-10-CM

## 2024-12-16 LAB
ALBUMIN SERPL BCP-MCNC: 4.5 G/DL (ref 3.5–5.2)
ALP SERPL-CCNC: 65 U/L (ref 40–150)
ALT SERPL W/O P-5'-P-CCNC: 36 U/L (ref 10–44)
ANION GAP SERPL CALC-SCNC: 14 MMOL/L (ref 8–16)
AST SERPL-CCNC: 23 U/L (ref 10–40)
BASOPHILS # BLD AUTO: 0.05 K/UL (ref 0–0.2)
BASOPHILS NFR BLD: 0.7 % (ref 0–1.9)
BILIRUB SERPL-MCNC: 0.5 MG/DL (ref 0.1–1)
BUN SERPL-MCNC: 18 MG/DL (ref 8–23)
CALCIUM SERPL-MCNC: 9.9 MG/DL (ref 8.7–10.5)
CHLORIDE SERPL-SCNC: 97 MMOL/L (ref 95–110)
CO2 SERPL-SCNC: 24 MMOL/L (ref 23–29)
CREAT SERPL-MCNC: 1.3 MG/DL (ref 0.5–1.4)
DIFFERENTIAL METHOD BLD: NORMAL
EOSINOPHIL # BLD AUTO: 0.1 K/UL (ref 0–0.5)
EOSINOPHIL NFR BLD: 1.4 % (ref 0–8)
ERYTHROCYTE [DISTWIDTH] IN BLOOD BY AUTOMATED COUNT: 12.3 % (ref 11.5–14.5)
ERYTHROCYTE [SEDIMENTATION RATE] IN BLOOD BY PHOTOMETRIC METHOD: 3 MM/HR (ref 0–36)
EST. GFR  (NO RACE VARIABLE): 43.1 ML/MIN/1.73 M^2
GLUCOSE SERPL-MCNC: 504 MG/DL (ref 70–110)
HCT VFR BLD AUTO: 40.9 % (ref 37–48.5)
HGB BLD-MCNC: 14.7 G/DL (ref 12–16)
IMM GRANULOCYTES # BLD AUTO: 0.02 K/UL (ref 0–0.04)
IMM GRANULOCYTES NFR BLD AUTO: 0.3 % (ref 0–0.5)
LDH SERPL L TO P-CCNC: 204 U/L (ref 110–260)
LYMPHOCYTES # BLD AUTO: 1.9 K/UL (ref 1–4.8)
LYMPHOCYTES NFR BLD: 24.4 % (ref 18–48)
MCH RBC QN AUTO: 29.6 PG (ref 27–31)
MCHC RBC AUTO-ENTMCNC: 35.9 G/DL (ref 32–36)
MCV RBC AUTO: 82 FL (ref 82–98)
MONOCYTES # BLD AUTO: 0.4 K/UL (ref 0.3–1)
MONOCYTES NFR BLD: 5.4 % (ref 4–15)
NEUTROPHILS # BLD AUTO: 5.2 K/UL (ref 1.8–7.7)
NEUTROPHILS NFR BLD: 67.8 % (ref 38–73)
NRBC BLD-RTO: 0 /100 WBC
PLATELET # BLD AUTO: 203 K/UL (ref 150–450)
PMV BLD AUTO: 11.1 FL (ref 9.2–12.9)
POTASSIUM SERPL-SCNC: 4.5 MMOL/L (ref 3.5–5.1)
PROT SERPL-MCNC: 7.5 G/DL (ref 6–8.4)
RBC # BLD AUTO: 4.97 M/UL (ref 4–5.4)
SODIUM SERPL-SCNC: 135 MMOL/L (ref 136–145)
WBC # BLD AUTO: 7.66 K/UL (ref 3.9–12.7)

## 2024-12-16 PROCEDURE — 85652 RBC SED RATE AUTOMATED: CPT | Performed by: INTERNAL MEDICINE

## 2024-12-16 PROCEDURE — 99999 PR PBB SHADOW E&M-EST. PATIENT-LVL IV: CPT | Mod: PBBFAC,,, | Performed by: INTERNAL MEDICINE

## 2024-12-16 PROCEDURE — 85025 COMPLETE CBC W/AUTO DIFF WBC: CPT | Mod: 91,PN | Performed by: INTERNAL MEDICINE

## 2024-12-16 PROCEDURE — 36415 COLL VENOUS BLD VENIPUNCTURE: CPT | Mod: PN | Performed by: INTERNAL MEDICINE

## 2024-12-16 PROCEDURE — 80053 COMPREHEN METABOLIC PANEL: CPT | Mod: 91,PN | Performed by: INTERNAL MEDICINE

## 2024-12-16 PROCEDURE — 83615 LACTATE (LD) (LDH) ENZYME: CPT | Mod: PN | Performed by: INTERNAL MEDICINE

## 2024-12-16 PROCEDURE — 3078F DIAST BP <80 MM HG: CPT | Mod: CPTII,S$GLB,, | Performed by: INTERNAL MEDICINE

## 2024-12-16 PROCEDURE — 1159F MED LIST DOCD IN RCRD: CPT | Mod: CPTII,S$GLB,, | Performed by: INTERNAL MEDICINE

## 2024-12-16 PROCEDURE — 3074F SYST BP LT 130 MM HG: CPT | Mod: CPTII,S$GLB,, | Performed by: INTERNAL MEDICINE

## 2024-12-16 PROCEDURE — 1101F PT FALLS ASSESS-DOCD LE1/YR: CPT | Mod: CPTII,S$GLB,, | Performed by: INTERNAL MEDICINE

## 2024-12-16 PROCEDURE — 1160F RVW MEDS BY RX/DR IN RCRD: CPT | Mod: CPTII,S$GLB,, | Performed by: INTERNAL MEDICINE

## 2024-12-16 PROCEDURE — 3288F FALL RISK ASSESSMENT DOCD: CPT | Mod: CPTII,S$GLB,, | Performed by: INTERNAL MEDICINE

## 2024-12-16 PROCEDURE — G2211 COMPLEX E/M VISIT ADD ON: HCPCS | Mod: S$GLB,,, | Performed by: INTERNAL MEDICINE

## 2024-12-16 PROCEDURE — 3008F BODY MASS INDEX DOCD: CPT | Mod: CPTII,S$GLB,, | Performed by: INTERNAL MEDICINE

## 2024-12-16 PROCEDURE — 4010F ACE/ARB THERAPY RXD/TAKEN: CPT | Mod: CPTII,S$GLB,, | Performed by: INTERNAL MEDICINE

## 2024-12-16 PROCEDURE — 1126F AMNT PAIN NOTED NONE PRSNT: CPT | Mod: CPTII,S$GLB,, | Performed by: INTERNAL MEDICINE

## 2024-12-16 PROCEDURE — 99214 OFFICE O/P EST MOD 30 MIN: CPT | Mod: S$GLB,,, | Performed by: INTERNAL MEDICINE

## 2024-12-16 PROCEDURE — 3044F HG A1C LEVEL LT 7.0%: CPT | Mod: CPTII,S$GLB,, | Performed by: INTERNAL MEDICINE

## 2024-12-16 RX ORDER — MONTELUKAST SODIUM 10 MG/1
10 TABLET ORAL NIGHTLY
COMMUNITY
Start: 2024-10-17

## 2024-12-16 NOTE — PROGRESS NOTES
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  She lost more than 10 lb unintentionally over a year.    The patient denies any fever chills night sweats nausea vomiting abdominal pain cough phlegm melena hematochezia or hematemesis.    PREVIOUS HISTORY:  Patient underwent on 2024 a mammogram diagnostic with left deanna an ultrasound of the left breast came in view of her personal history of breast cancer.  She was found to have a left breast mass measuring 6 mm x 4 mm x 6 mm at 2:00 a.m..  There was an abnormal lymph node also noted an ultrasound-guided biopsy was recommended.    She underwent on 2024 a left breast biopsy that showed no tumor seen.  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  The Ki-67 was very low.      Mother had NHL in her late 70's. She had a stage 3. She had chemotherapy and wet into remission. She relapse nichol year. She received further systemic therapy.  First paternal cousin had breast cancer in her 40's    Oncology History    No history exists.        Past Medical History:   Diagnosis Date    Anxiety     Breast cancer     Colon polyps     Depression     Gastro-esophageal reflux     History of chicken pox     Hyperlipidemia     Hypertension     Hypothyroidism     Migraine     Prediabetes     Shingles 10/31/2018    seen by urgent care    Usual hyperplasia of lactiferous duct        Past Surgical History:   Procedure Laterality Date    BIOPSY, BREAST, WITH RADAR LOCALIZATION USING DIMPLE  Left 2024    Procedure: BIOPSY,  BREAST, WITH RADAR LOCALIZATION USING DIMPLE ;  Surgeon: Fior Juan MD;  Location: [facility] OR;  Service: General;  Laterality: Left;    BREAST BIOPSY Left     BREAST BIOPSY Bilateral     BREAST LUMPECTOMY Left     pt had radiation    CARPAL TUNNEL RELEASE      COLONOSCOPY      CYSTOSCOPY      EPIDURAL STEROID INJECTION INTO CERVICAL SPINE N/A 2021    Procedure: Injection-steroid-epidural-cervical;  Surgeon: Tad Dyer MD;  Location: [facility] OR;  Service: Pain Management;  Laterality: N/A;    HYSTERECTOMY      OOPHORECTOMY      TOE SURGERY Right     right big toe ingrown     TUBAL LIGATION         Family History   Problem Relation Name Age of Onset    Lymphoma Mother Mom     Thyroid disease Mother Mom     Hypertension Mother Mom     Cancer Mother Mom     Diabetes Father Dad     Heart disease Father Dad     Hyperlipidemia Father Dad     Hypertension Father Dad     Stroke Father Dad     Breast cancer Paternal Cousin      Mental illness Brother Tobias        Social History     Socioeconomic History    Marital status:      Spouse name: Fredrick    Number of children: 3   Tobacco Use    Smoking status: Former     Current packs/day: 0.00     Types: Cigarettes     Quit date: 1976     Years since quittin.9    Smokeless tobacco: Never   Substance and Sexual Activity    Alcohol use: Yes     Alcohol/week: 1.0 standard drink of alcohol     Types: 1 Glasses of wine per week     Comment: rarely    Drug use: No    Sexual activity: Yes     Partners: Male     Social Drivers of Health     Financial Resource Strain: Low Risk  (11/10/2023)    Overall Financial Resource Strain (CARDIA)     Difficulty of Paying Living Expenses: Not hard at all   Food Insecurity: No Food Insecurity (11/10/2023)    Hunger Vital Sign     Worried About Running Out of Food in the Last Year: Never true     Ran Out of Food in the Last Year: Never true   Transportation Needs: No Transportation Needs (11/10/2023)  "   PRAPARE - Transportation     Lack of Transportation (Medical): No     Lack of Transportation (Non-Medical): No   Physical Activity: Unknown (11/10/2023)    Exercise Vital Sign     Days of Exercise per Week: 0 days   Recent Concern: Physical Activity - Inactive (11/10/2023)    Exercise Vital Sign     Days of Exercise per Week: 0 days     Minutes of Exercise per Session: 0 min   Stress: Stress Concern Present (11/10/2023)    [employer] [place] [employer] - Occupational Stress Questionnaire     Feeling of Stress : To some extent   Housing Stability: Unknown (11/10/2023)    Housing Stability Vital Sign     Unable to Pay for Housing in the Last Year: No     Unstable Housing in the Last Year: No       Review of patient's allergies indicates:   Allergen Reactions    Ciprofloxacin      Stomach pain      Sulfa (sulfonamide antibiotics)      Stomach      ROS:  Answers submitted by the patient for this visit:  Review of Systems Questionnaire (Submitted on 4/3/2024)  appetite change : No  unexpected weight change: No  mouth sores: No  visual disturbance: No  cough: No  shortness of breath: No  chest pain: No  abdominal pain: No  diarrhea: No  frequency: No  back pain: No  rash: No  headaches: No  adenopathy: No  nervous/ anxious: Yes           Objective:     Vitals:    12/16/24 1413   BP: 121/72   BP Location: Right arm   Patient Position: Sitting   Pulse: 76   Resp: 16   Temp: 96.9 °F (36.1 °C)   TempSrc: Temporal   SpO2: (!) 94%   Weight: 74.8 kg (164 lb 14.5 oz)   Height: 5' 1" (1.549 m)          Physical Exam  Vitals reviewed.   Constitutional:       Appearance: Normal appearance.   Eyes:      General: No scleral icterus.     Pupils: Pupils are equal, round, and reactive to light.   Cardiovascular:      Rate and Rhythm: Normal rate and regular rhythm.      Pulses: Normal pulses.      Heart sounds: Normal heart sounds.   Pulmonary:      Effort: Pulmonary effort is normal.      Breath sounds: Normal breath " sounds.   Abdominal:      General: Bowel sounds are normal. There is no distension.   Musculoskeletal:         General: No swelling.   Lymphadenopathy:      Cervical: No cervical adenopathy.   Skin:     General: Skin is warm.      Findings: No rash.   Neurological:      General: No focal deficit present.      Mental Status: She is alert.   Psychiatric:         Mood and Affect: Mood normal.                Current Outpatient Medications on File Prior to Visit   Medication Sig    amLODIPine (NORVASC) 10 MG tablet Take 1 tablet (10 mg total) by mouth once daily.    hydroCHLOROthiazide (HYDRODIURIL) 12.5 MG Tab Take 1 tablet (12.5 mg total) by mouth once daily.    levothyroxine (SYNTHROID) 75 MCG tablet Take 1 tablet (75 mcg total) by mouth once daily.    lisinopriL (PRINIVIL,ZESTRIL) 40 MG tablet Take 1 tablet (40 mg total) by mouth once daily.    LORazepam (ATIVAN) 1 MG tablet Take 1 tablet by mouth twice daily as needed for anxiety    montelukast (SINGULAIR) 10 mg tablet Take 10 mg by mouth every evening.    omeprazole (PRILOSEC) 40 MG capsule Take 1 capsule (40 mg total) by mouth every morning.    rosuvastatin (CRESTOR) 5 MG tablet Take 1 tablet (5 mg total) by mouth once daily.    sertraline (ZOLOFT) 100 MG tablet Take 1 tablet (100 mg total) by mouth once daily.    solifenacin (VESICARE) 5 MG tablet Take 5 mg by mouth daily as needed.     Current Facility-Administered Medications on File Prior to Visit   Medication    chlorhexidine 0.12 % solution 15 mL    dextrose 50% injection 12.5 g    dextrose 50% injection 25 g    insulin regular injection 0-8 Units    lactated ringers infusion    LIDOcaine-EPINEPHrine (PF) 1%-1:200,000 injection 20 mL    mupirocin 2 % ointment       CBC:  Lab Results   Component Value Date    WBC 7.66 12/16/2024    HGB 14.7 12/16/2024    HCT 40.9 12/16/2024    MCV 82 12/16/2024     12/16/2024         CMP:  Sodium   Date Value Ref Range Status   12/16/2024 135 (L) 136 - 145 mmol/L Final      Potassium   Date Value Ref Range Status   12/16/2024 4.5 3.5 - 5.1 mmol/L Final     Comment:     No visible hemolysis     Chloride   Date Value Ref Range Status   12/16/2024 97 95 - 110 mmol/L Final     CO2   Date Value Ref Range Status   12/16/2024 24 23 - 29 mmol/L Final     Glucose   Date Value Ref Range Status   12/16/2024 504 (HH) 70 - 110 mg/dL Final     Comment:     Glucose critical result(s) called and verbal readback obtained from   Lexy ESTRADA RN  by CD4 12/16/2024 15:00       BUN   Date Value Ref Range Status   12/16/2024 18 8 - 23 mg/dL Final     Creatinine   Date Value Ref Range Status   12/16/2024 1.3 0.5 - 1.4 mg/dL Final     Calcium   Date Value Ref Range Status   12/16/2024 9.9 8.7 - 10.5 mg/dL Final     Total Protein   Date Value Ref Range Status   12/16/2024 7.5 6.0 - 8.4 g/dL Final     Albumin   Date Value Ref Range Status   12/16/2024 4.5 3.5 - 5.2 g/dL Final     Total Bilirubin   Date Value Ref Range Status   12/16/2024 0.5 0.1 - 1.0 mg/dL Final     Comment:     For infants and newborns, interpretation of results should be based  on gestational age, weight and in agreement with clinical  observations.    Premature Infant recommended reference ranges:  Up to 24 hours.............<8.0 mg/dL  Up to 48 hours............<12.0 mg/dL  3-5 days..................<15.0 mg/dL  6-29 days.................<15.0 mg/dL       Alkaline Phosphatase   Date Value Ref Range Status   12/16/2024 65 40 - 150 U/L Final     AST ([place])   Date Value Ref Range Status   03/08/2016 28 14 - 36 U/L Final     AST   Date Value Ref Range Status   12/16/2024 23 10 - 40 U/L Final     ALT   Date Value Ref Range Status   12/16/2024 36 10 - 44 U/L Final     Anion Gap   Date Value Ref Range Status   12/16/2024 14 8 - 16 mmol/L Final     eGFR if    Date Value Ref Range Status   03/14/2022 >60 >60 mL/min/1.73 m^2 Final     eGFR if non    Date Value Ref Range Status   03/14/2022 >60 >60  "mL/min/1.73 m^2 Final     Comment:     Calculation used to obtain the estimated glomerular filtration  rate (eGFR) is the CKD-EPI equation.          Mammo Breast Specimen  SPECIMEN RADIOGRAPH    A single faxitron specimen image was presented for interpretation.    FINDINGS/IMPRESSION: There is a heart shaped biopsy clip and a radar   reflector in the specimen.       ECOG SCORE    1 - Restricted in strenuous activity-ambulatory and able to carry out work of a light nature              Assessment/Plan:       Follicular lymphoma grade 1-2.    I reviewed independently the patient medical record including her laboratory radiologic and pathologic findings.  I discussed with her the results of her biopsy and  the natural biology of her disease.  This is an indolent non-Hodgkin's lymphoma that waxes and wanes.  This is a treatable condition but not curable.   She will require treatment if there is evidence of end-organ damage that include but not restricted to fever chills night sweats unintentional weight loss abnormal counts recurrent infections or symptomatic pain due to enlarged lymph node.  PET scan for staging purposes done on March 21, 2024 showed multiple left axillary lymph node were noted.  Maximum SUV was 3.1.  There was a mild splenomegaly noted with an SUV of 2.7.  No other abnormality was noted. .    Blood workup drawn on December 16 , 2024 showed a normal CBC CMP ESR LDH except for an elevated glucose more than 500.    Since the patient is asymptomatic she will continue to be followed clinically with repeat labs.  She will be seen again in 4 months with repeat labs  CBC CMP ESR LDH       History of breast cancer - DCIS  1996. Lumpectomy and RT and Tamoxifen for 5 years  She was offered to be evaluated by genetic counselor; she declined.      Abnormal mammogram of the left breast:  March 6, 2024: "2:00 mass could represent fat necrosis based on imaging appearance, this remains concerning as it is new with no " "history of trauma and its proximity to the lumpectomy site. Surgical consultation is recommended. "  S/p excisional biopsy in April 25, 2024 that showed signs of fat necrosis with no signs of malignancy.      New onset diabetes:  Glucose: 504 K 4.5  In view of her new onset diabetes the patient will be sent to the emergency room since she is symptomatic complaining of lightheadedness and fogginess.      Obesity BMI 30.99 today  -A higher BMI and/or perimenopausal weight gain have been consistently associated with a higher risk of breast cancer among postmenopausal women.The association between a higher BMI and postmenopausal breast cancer risk may be mediated by higher estrogen levels resulting from the peripheral conversion of estrogen precursors (from adipose tissue) to estrogen. Overweight, Obesity, and Postmenopausal Invasive Breast Cancer Risk: A Secondary Analysis of the Women's Health Initiative Randomized Clinical Trials.  ADELIA Oncol. 2015;1(5):611.   In I discussed these findings the patient was advised to exercise maintain healthy lifestyle and to lose weight.            Med Onc Chart Routing      Follow up with physician 4 months. with repeat CBC CMP ESR LDH   Follow up with MOMO    Infusion scheduling note    Injection scheduling note    Labs    Imaging    Pharmacy appointment    Other referrals                   Plan was discussed with the patient at length, and she verbalized understanding. Xiao was given an opportunity to ask questions that were answered to her satisfaction, and she was advised to call in the interval if any problems or questions arise.  Signed:  Christine Juárez MD   Hematology and Oncology  [facility] [address] [facility] [place]      "

## 2024-12-17 PROBLEM — E11.65 CONTROLLED TYPE 2 DIABETES MELLITUS WITH HYPERGLYCEMIA, WITHOUT LONG-TERM CURRENT USE OF INSULIN: Status: ACTIVE | Noted: 2024-12-16

## 2025-04-16 ENCOUNTER — OFFICE VISIT (OUTPATIENT)
Dept: HEMATOLOGY/ONCOLOGY | Facility: CLINIC | Age: 75
End: 2025-04-16
Payer: MEDICARE

## 2025-04-16 ENCOUNTER — LAB VISIT (OUTPATIENT)
Dept: LAB | Facility: HOSPITAL | Age: 75
End: 2025-04-16
Attending: INTERNAL MEDICINE
Payer: MEDICARE

## 2025-04-16 VITALS
TEMPERATURE: 98 F | DIASTOLIC BLOOD PRESSURE: 70 MMHG | HEART RATE: 64 BPM | OXYGEN SATURATION: 96 % | WEIGHT: 165.81 LBS | RESPIRATION RATE: 17 BRPM | SYSTOLIC BLOOD PRESSURE: 138 MMHG | HEIGHT: 61 IN | BODY MASS INDEX: 31.3 KG/M2

## 2025-04-16 DIAGNOSIS — Z85.3 HISTORY OF BREAST CANCER: ICD-10-CM

## 2025-04-16 DIAGNOSIS — R92.8 ABNORMAL MAMMOGRAM OF LEFT BREAST: ICD-10-CM

## 2025-04-16 DIAGNOSIS — E66.9 OBESITY (BMI 30-39.9): ICD-10-CM

## 2025-04-16 DIAGNOSIS — C82.54 DIFFUSE FOLLICLE CENTER LYMPHOMA OF LYMPH NODES OF AXILLA: ICD-10-CM

## 2025-04-16 DIAGNOSIS — C82.54 DIFFUSE FOLLICLE CENTER LYMPHOMA OF LYMPH NODES OF AXILLA: Primary | ICD-10-CM

## 2025-04-16 LAB
ABSOLUTE EOSINOPHIL (OHS): 0.13 K/UL
ABSOLUTE MONOCYTE (OHS): 0.52 K/UL (ref 0.3–1)
ABSOLUTE NEUTROPHIL COUNT (OHS): 4.19 K/UL (ref 1.8–7.7)
ALBUMIN SERPL BCP-MCNC: 4.4 G/DL (ref 3.5–5.2)
ALP SERPL-CCNC: 49 UNIT/L (ref 40–150)
ALT SERPL W/O P-5'-P-CCNC: 25 UNIT/L (ref 10–44)
ANION GAP (OHS): 10 MMOL/L (ref 8–16)
AST SERPL-CCNC: 21 UNIT/L (ref 11–45)
BASOPHILS # BLD AUTO: 0.04 K/UL
BASOPHILS NFR BLD AUTO: 0.5 %
BILIRUB SERPL-MCNC: 0.4 MG/DL (ref 0.1–1)
BUN SERPL-MCNC: 21 MG/DL (ref 8–23)
CALCIUM SERPL-MCNC: 9.6 MG/DL (ref 8.7–10.5)
CHLORIDE SERPL-SCNC: 104 MMOL/L (ref 95–110)
CO2 SERPL-SCNC: 25 MMOL/L (ref 23–29)
CREAT SERPL-MCNC: 0.9 MG/DL (ref 0.5–1.4)
ERYTHROCYTE [DISTWIDTH] IN BLOOD BY AUTOMATED COUNT: 13.2 % (ref 11.5–14.5)
ERYTHROCYTE [SEDIMENTATION RATE] IN BLOOD BY PHOTOMETRIC METHOD: 19 MM/HR
GFR SERPLBLD CREATININE-BSD FMLA CKD-EPI: >60 ML/MIN/1.73/M2
GLUCOSE SERPL-MCNC: 97 MG/DL (ref 70–110)
HCT VFR BLD AUTO: 42.3 % (ref 37–48.5)
HGB BLD-MCNC: 14.6 GM/DL (ref 12–16)
IMM GRANULOCYTES # BLD AUTO: 0.02 K/UL (ref 0–0.04)
IMM GRANULOCYTES NFR BLD AUTO: 0.3 % (ref 0–0.5)
LDH SERPL-CCNC: 185 U/L (ref 110–260)
LYMPHOCYTES # BLD AUTO: 2.41 K/UL (ref 1–4.8)
MCH RBC QN AUTO: 29.4 PG (ref 27–31)
MCHC RBC AUTO-ENTMCNC: 34.5 G/DL (ref 32–36)
MCV RBC AUTO: 85 FL (ref 82–98)
NUCLEATED RBC (/100WBC) (OHS): 0 /100 WBC
PLATELET # BLD AUTO: 224 K/UL (ref 150–450)
PMV BLD AUTO: 9.7 FL (ref 9.2–12.9)
POTASSIUM SERPL-SCNC: 4.1 MMOL/L (ref 3.5–5.1)
PROT SERPL-MCNC: 7.6 GM/DL (ref 6–8.4)
RBC # BLD AUTO: 4.97 M/UL (ref 4–5.4)
RELATIVE EOSINOPHIL (OHS): 1.8 %
RELATIVE LYMPHOCYTE (OHS): 33 % (ref 18–48)
RELATIVE MONOCYTE (OHS): 7.1 % (ref 4–15)
RELATIVE NEUTROPHIL (OHS): 57.3 % (ref 38–73)
SODIUM SERPL-SCNC: 139 MMOL/L (ref 136–145)
WBC # BLD AUTO: 7.31 K/UL (ref 3.9–12.7)

## 2025-04-16 PROCEDURE — 83615 LACTATE (LD) (LDH) ENZYME: CPT | Mod: PN

## 2025-04-16 PROCEDURE — 4010F ACE/ARB THERAPY RXD/TAKEN: CPT | Mod: CPTII,S$GLB,, | Performed by: INTERNAL MEDICINE

## 2025-04-16 PROCEDURE — 3078F DIAST BP <80 MM HG: CPT | Mod: CPTII,S$GLB,, | Performed by: INTERNAL MEDICINE

## 2025-04-16 PROCEDURE — 85652 RBC SED RATE AUTOMATED: CPT

## 2025-04-16 PROCEDURE — 82040 ASSAY OF SERUM ALBUMIN: CPT | Mod: PN

## 2025-04-16 PROCEDURE — 1126F AMNT PAIN NOTED NONE PRSNT: CPT | Mod: CPTII,S$GLB,, | Performed by: INTERNAL MEDICINE

## 2025-04-16 PROCEDURE — 99213 OFFICE O/P EST LOW 20 MIN: CPT | Mod: S$GLB,,, | Performed by: INTERNAL MEDICINE

## 2025-04-16 PROCEDURE — 3075F SYST BP GE 130 - 139MM HG: CPT | Mod: CPTII,S$GLB,, | Performed by: INTERNAL MEDICINE

## 2025-04-16 PROCEDURE — 85025 COMPLETE CBC W/AUTO DIFF WBC: CPT | Mod: PN

## 2025-04-16 PROCEDURE — 1101F PT FALLS ASSESS-DOCD LE1/YR: CPT | Mod: CPTII,S$GLB,, | Performed by: INTERNAL MEDICINE

## 2025-04-16 PROCEDURE — 1159F MED LIST DOCD IN RCRD: CPT | Mod: CPTII,S$GLB,, | Performed by: INTERNAL MEDICINE

## 2025-04-16 PROCEDURE — 3288F FALL RISK ASSESSMENT DOCD: CPT | Mod: CPTII,S$GLB,, | Performed by: INTERNAL MEDICINE

## 2025-04-16 PROCEDURE — 99999 PR PBB SHADOW E&M-EST. PATIENT-LVL IV: CPT | Mod: PBBFAC,,, | Performed by: INTERNAL MEDICINE

## 2025-04-16 PROCEDURE — 36415 COLL VENOUS BLD VENIPUNCTURE: CPT | Mod: PN

## 2025-04-16 PROCEDURE — 3008F BODY MASS INDEX DOCD: CPT | Mod: CPTII,S$GLB,, | Performed by: INTERNAL MEDICINE

## 2025-04-16 PROCEDURE — G2211 COMPLEX E/M VISIT ADD ON: HCPCS | Mod: S$GLB,,, | Performed by: INTERNAL MEDICINE

## 2025-04-16 NOTE — PROGRESS NOTES
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    PREVIOUS HISTORY:  Patient underwent on 2024 a mammogram diagnostic with left deanna an ultrasound of the left breast came in view of her personal history of breast cancer.  She was found to have a left breast mass measuring 6 mm x 4 mm x 6 mm at 2:00 a.m..  There was an abnormal lymph node also noted an ultrasound-guided biopsy was recommended.    She underwent on 2024 a left breast biopsy that showed no tumor seen.  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  The Ki-67 was very low.      Mother had NHL in her late 70's. She had a stage 3. She had chemotherapy and wet into remission. She relapse nichol year. She received further systemic therapy.  First paternal cousin had breast cancer in her 40's    Oncology History    No history exists.        Past Medical History:   Diagnosis Date    Anxiety     Breast cancer     Colon polyps     Depression     Gastro-esophageal reflux     History of chicken pox     Hyperlipidemia     Hypertension     Hypothyroidism     Migraine     Prediabetes     Shingles 10/31/2018    seen by urgent care    Usual hyperplasia of lactiferous duct        Past Surgical History:   Procedure Laterality Date    BIOPSY, BREAST, WITH RADAR LOCALIZATION USING DIMPLE  Left 2024    Procedure: BIOPSY, BREAST, WITH RADAR LOCALIZATION USING DIMPLE ;  Surgeon: Fior Juan MD;  [facility];   Service: General;  Laterality: Left;    BREAST BIOPSY Left 1998    BREAST BIOPSY Bilateral 1999    BREAST LUMPECTOMY Left     pt had radiation    CARPAL TUNNEL RELEASE      COLONOSCOPY      CYSTOSCOPY      EPIDURAL STEROID INJECTION INTO CERVICAL SPINE N/A 2021    Procedure: Injection-steroid-epidural-cervical;  Surgeon: Tad Dyer MD;  Location: [facility] OR;  Service: Pain Management;  Laterality: N/A;    HYSTERECTOMY      OOPHORECTOMY      TOE SURGERY Right     right big toe ingrown     TUBAL LIGATION         Family History   Problem Relation Name Age of Onset    Lymphoma Mother Mom     Thyroid disease Mother Mom     Hypertension Mother Mom     Cancer Mother Mom     Diabetes Father Dad     Heart disease Father Dad     Hyperlipidemia Father Dad     Hypertension Father Dad     Stroke Father Dad     Breast cancer Paternal Cousin      Mental illness Brother Tobias        Social History     Socioeconomic History    Marital status:      Spouse name: Fredrick    Number of children: 3   Tobacco Use    Smoking status: Former     Current packs/day: 0.00     Types: Cigarettes     Quit date: 1976     Years since quittin.3    Smokeless tobacco: Never   Substance and Sexual Activity    Alcohol use: Yes     Alcohol/week: 1.0 standard drink of alcohol     Types: 1 Glasses of wine per week     Comment: rarely    Drug use: No    Sexual activity: Yes     Partners: Male     Social Drivers of Health     Financial Resource Strain: Low Risk  (2025)    Overall Financial Resource Strain (CARDIA)     Difficulty of Paying Living Expenses: Not hard at all   Food Insecurity: No Food Insecurity (2025)    Hunger Vital Sign     Worried About Running Out of Food in the Last Year: Never true     Ran Out of Food in the Last Year: Never true   Transportation Needs: No Transportation Needs (11/10/2023)    PRAPARE - Transportation     Lack of Transportation (Medical): No     Lack of Transportation (Non-Medical):  "No   Physical Activity: Inactive (1/28/2025)    Exercise Vital Sign     Days of Exercise per Week: 0 days     Minutes of Exercise per Session: 0 min   Stress: No Stress Concern Present (1/28/2025)    [employer] [place] [employer] - Occupational Stress Questionnaire     Feeling of Stress : Only a little   Housing Stability: Unknown (11/10/2023)    Housing Stability Vital Sign     Unable to Pay for Housing in the Last Year: No     Unstable Housing in the Last Year: No       Review of patient's allergies indicates:   Allergen Reactions    Ciprofloxacin      Stomach pain      Sulfa (sulfonamide antibiotics)      Stomach      ROS:  Answers submitted by the patient for this visit:  Review of Systems Questionnaire (Submitted on 4/10/2025)  appetite change : No  unexpected weight change: No  mouth sores: No  visual disturbance: No  cough: No  shortness of breath: No  chest pain: No  abdominal pain: No  diarrhea: No  frequency: No  back pain: No  rash: No  headaches: No  adenopathy: No  nervous/ anxious: No         Objective:     Vitals:    04/16/25 1340   BP: 138/70   BP Location: Left arm   Patient Position: Sitting   Pulse: 64   Resp: 17   Temp: 98.2 °F (36.8 °C)   TempSrc: Temporal   SpO2: 96%   Weight: 75.2 kg (165 lb 12.6 oz)   Height: 5' 1" (1.549 m)          Physical Exam  Vitals reviewed.   Constitutional:       Appearance: Normal appearance.   Eyes:      General: No scleral icterus.     Pupils: Pupils are equal, round, and reactive to light.   Cardiovascular:      Rate and Rhythm: Normal rate and regular rhythm.      Pulses: Normal pulses.      Heart sounds: Normal heart sounds.   Pulmonary:      Effort: Pulmonary effort is normal.      Breath sounds: Normal breath sounds.   Abdominal:      General: Bowel sounds are normal. There is no distension.   Musculoskeletal:         General: No swelling.   Lymphadenopathy:      Cervical: No cervical adenopathy.   Skin:     General: Skin is warm.      Findings: No " rash.   Neurological:      General: No focal deficit present.      Mental Status: She is alert.   Psychiatric:         Mood and Affect: Mood normal.                Current Outpatient Medications on File Prior to Visit   Medication Sig    amLODIPine (NORVASC) 10 MG tablet Take 1 tablet (10 mg total) by mouth once daily.    blood sugar diagnostic Strp To check BG 1 time daily, to use with insurance preferred meter    blood-glucose meter kit To check BG 1 time daily, to use with insurance preferred meter    empagliflozin (JARDIANCE) 10 mg tablet Take 10 mg by mouth once daily.    hydroCHLOROthiazide (HYDRODIURIL) 12.5 MG Tab Take 1 tablet (12.5 mg total) by mouth once daily.    lancets Misc To check BG 1 time daily, to use with insurance preferred meter    levothyroxine (SYNTHROID) 75 MCG tablet Take 1 tablet (75 mcg total) by mouth once daily.    lisinopriL (PRINIVIL,ZESTRIL) 40 MG tablet Take 1 tablet (40 mg total) by mouth once daily.    lisinopriL (PRINIVIL,ZESTRIL) 40 MG tablet Take 1 tablet (40 mg total) by mouth once daily.    LORazepam (ATIVAN) 1 MG tablet TAKE ONE TABLET BY MOUTH TWICE DAILY AS NEEDED FOR ANXIETY    montelukast (SINGULAIR) 10 mg tablet Take 10 mg by mouth every evening.    omeprazole (PRILOSEC) 40 MG capsule Take 1 capsule (40 mg total) by mouth every morning.    pioglitazone (ACTOS) 15 MG tablet Take 1 tablet (15 mg total) by mouth once daily.    rosuvastatin (CRESTOR) 10 MG tablet Take 1 tablet (10 mg total) by mouth once daily.    sertraline (ZOLOFT) 100 MG tablet Take 1 tablet (100 mg total) by mouth once daily.    solifenacin (VESICARE) 5 MG tablet Take 5 mg by mouth daily as needed.     No current facility-administered medications on file prior to visit.       CBC:  Lab Results   Component Value Date    WBC 7.31 04/16/2025    HGB 14.6 04/16/2025    HCT 42.3 04/16/2025    MCV 85 04/16/2025     04/16/2025         CMP:  Sodium   Date Value Ref Range Status   04/16/2025 139 136 - 145  mmol/L Final   12/16/2024 133 (L) 136 - 145 mmol/L Final     Potassium   Date Value Ref Range Status   04/16/2025 4.1 3.5 - 5.1 mmol/L Final   12/16/2024 3.9 3.5 - 5.1 mmol/L Final     Comment:     Anion Gap reference range revised on 4/28/2023     Chloride   Date Value Ref Range Status   04/16/2025 104 95 - 110 mmol/L Final   12/16/2024 98 95 - 110 mmol/L Final     CO2   Date Value Ref Range Status   04/16/2025 25 23 - 29 mmol/L Final   12/16/2024 23 23 - 29 mmol/L Final     Glucose   Date Value Ref Range Status   12/16/2024 483 (HH) 70 - 110 mg/dL Final     Comment:     The ADA recommends the following guidelines for fasting glucose:    Normal:       less than 100 mg/dL    Prediabetes:  100 mg/dL to 125 mg/dL    Diabetes:     126 mg/dL or higher  Glucose critical result called to Jamel Baker RN and verbal   readback  obtained at 12/16/24, 16:52 by POR       BUN   Date Value Ref Range Status   04/16/2025 21 8 - 23 mg/dL Final     Creatinine   Date Value Ref Range Status   04/16/2025 0.9 0.5 - 1.4 mg/dL Final     Calcium   Date Value Ref Range Status   04/16/2025 9.6 8.7 - 10.5 mg/dL Final   12/16/2024 10.0 8.7 - 10.5 mg/dL Final     Total Protein   Date Value Ref Range Status   12/16/2024 7.1 6.0 - 8.4 g/dL Final     Albumin   Date Value Ref Range Status   04/16/2025 4.4 3.5 - 5.2 g/dL Final   12/16/2024 4.8 3.5 - 5.2 g/dL Final     Total Bilirubin   Date Value Ref Range Status   12/16/2024 0.5 0.2 - 1.0 mg/dL Final     Bilirubin Total   Date Value Ref Range Status   04/16/2025 0.4 0.1 - 1.0 mg/dL Final     Comment:     For infants and newborns, interpretation of results should be based   on gestational age, weight and in agreement with clinical   observations.    Premature Infant recommended reference ranges:   0-24 hours:  <8.0 mg/dL   24-48 hours: <12.0 mg/dL   3-5 days:    <15.0 mg/dL   6-29 days:   <15.0 mg/dL     Alkaline Phosphatase   Date Value Ref Range Status   12/16/2024 72 40 - 150 U/L Final     ALP    Date Value Ref Range Status   04/16/2025 49 40 - 150 unit/L Final     AST ([place])   Date Value Ref Range Status   03/08/2016 28 14 - 36 U/L Final     AST   Date Value Ref Range Status   04/16/2025 21 11 - 45 unit/L Final   12/16/2024 29 10 - 40 U/L Final     ALT   Date Value Ref Range Status   04/16/2025 25 10 - 44 unit/L Final   12/16/2024 40 10 - 44 U/L Final     Anion Gap   Date Value Ref Range Status   04/16/2025 10 8 - 16 mmol/L Final     eGFR if    Date Value Ref Range Status   03/14/2022 >60 >60 mL/min/1.73 m^2 Final     eGFR if non    Date Value Ref Range Status   03/14/2022 >60 >60 mL/min/1.73 m^2 Final     Comment:     Calculation used to obtain the estimated glomerular filtration  rate (eGFR) is the CKD-EPI equation.               ECOG SCORE    1 - Restricted in strenuous activity-ambulatory and able to carry out work of a light nature              Assessment/Plan:       Follicular lymphoma grade 1-2.    I reviewed independently the patient medical record including her laboratory radiologic and pathologic findings.  I discussed with her the results of her biopsy and  the natural biology of her disease.  This is an indolent non-Hodgkin's lymphoma that waxes and wanes.  This is a treatable condition but not curable.   She will require treatment if there is evidence of end-organ damage that include but not restricted to fever chills night sweats unintentional weight loss abnormal counts recurrent infections or symptomatic pain due to enlarged lymph node.  PET scan for staging purposes done on March 21, 2024 showed multiple left axillary lymph node were noted.  Maximum SUV was 3.1.  There was a mild splenomegaly noted with an SUV of 2.7.  No other abnormality was noted. .    -Labs today for CBC CMP ESR LDH are WNL  Since the patient is asymptomatic she will continue to be followed clinically with repeat labs.  She will be seen again in 4 months with repeat labs  CBC  "CMP ESR LDH       History of breast cancer - DCIS  1996. Lumpectomy and RT and Tamoxifen for 5 years  She was offered to be evaluated by genetic counselor; she declined.      Abnormal mammogram of the left breast:  March 6, 2024: "2:00 mass could represent fat necrosis based on imaging appearance, this remains concerning as it is new with no history of trauma and its proximity to the lumpectomy site. Surgical consultation is recommended. "  S/p excisional biopsy in April 25, 2024 that showed signs of fat necrosis with no signs of malignancy.  Due to have a repeat mammo in 5/2025    .      RECOMMENDED LIFESTYLE MODIFICATIONS FOR BREAST CANCER PATIENTS:      Reviewed Lifestyle modifications which have shown benefit:  Limit alcohol consumption to less than 1 drink per day (1 ounce liquor, 6 oz wine, 8 oz beer) and nor more than 3 drinks per week.   Avoid smoking.  Exercise at least 150 minutes per week of moderate intensity aerobic activity or at least 75 minutes of vigorous activity. Exercise can lower the relative risk of breast cancer by ~18-20%.  Obesity BMI 31.32 Maintain healthy weight and avoid post-menopausal weight gain. Avoid processed foods and eat more lean proteins, fruits and vegetables.                          Med Onc Chart Routing      Follow up with physician    Follow up with MOMO 4 months. with repeat CBC CMP ESR LDH   Infusion scheduling note    Injection scheduling note    Labs    Imaging    Pharmacy appointment    Other referrals                   Plan was discussed with the patient at length, and she verbalized understanding. Xiao was given an opportunity to ask questions that were answered to her satisfaction, and she was advised to call in the interval if any problems or questions arise.  Signed:  Christine Juárez MD   Hematology and Oncology  [facility] [address] [facility] [place]            "

## 2025-08-12 ENCOUNTER — TELEPHONE (OUTPATIENT)
Dept: HEMATOLOGY/ONCOLOGY | Facility: CLINIC | Age: 75
End: 2025-08-12
Payer: MEDICARE

## 2025-08-15 ENCOUNTER — LAB VISIT (OUTPATIENT)
Dept: LAB | Facility: HOSPITAL | Age: 75
End: 2025-08-15
Attending: INTERNAL MEDICINE
Payer: MEDICARE

## 2025-08-15 DIAGNOSIS — C82.54 DIFFUSE FOLLICLE CENTER LYMPHOMA OF LYMPH NODES OF AXILLA: ICD-10-CM

## 2025-08-15 DIAGNOSIS — Z85.3 HISTORY OF BREAST CANCER: ICD-10-CM

## 2025-08-15 LAB
ABSOLUTE EOSINOPHIL (OHS): 0.14 K/UL
ABSOLUTE MONOCYTE (OHS): 0.36 K/UL (ref 0.3–1)
ABSOLUTE NEUTROPHIL COUNT (OHS): 3.9 K/UL (ref 1.8–7.7)
ALBUMIN SERPL BCP-MCNC: 4.4 G/DL (ref 3.5–5.2)
ALP SERPL-CCNC: 44 UNIT/L (ref 40–150)
ALT SERPL W/O P-5'-P-CCNC: 24 UNIT/L (ref 10–44)
ANION GAP (OHS): 8 MMOL/L (ref 8–16)
AST SERPL-CCNC: 20 UNIT/L (ref 11–45)
BASOPHILS # BLD AUTO: 0.04 K/UL
BASOPHILS NFR BLD AUTO: 0.6 %
BILIRUB SERPL-MCNC: 0.4 MG/DL (ref 0.1–1)
BUN SERPL-MCNC: 21 MG/DL (ref 8–23)
CALCIUM SERPL-MCNC: 9.7 MG/DL (ref 8.7–10.5)
CHLORIDE SERPL-SCNC: 105 MMOL/L (ref 95–110)
CO2 SERPL-SCNC: 29 MMOL/L (ref 23–29)
CREAT SERPL-MCNC: 0.9 MG/DL (ref 0.5–1.4)
ERYTHROCYTE [DISTWIDTH] IN BLOOD BY AUTOMATED COUNT: 13.2 % (ref 11.5–14.5)
ERYTHROCYTE [SEDIMENTATION RATE] IN BLOOD BY PHOTOMETRIC METHOD: 13 MM/HR
GFR SERPLBLD CREATININE-BSD FMLA CKD-EPI: >60 ML/MIN/1.73/M2
GLUCOSE SERPL-MCNC: 110 MG/DL (ref 70–110)
HCT VFR BLD AUTO: 42.5 % (ref 37–48.5)
HGB BLD-MCNC: 14.5 GM/DL (ref 12–16)
IMM GRANULOCYTES # BLD AUTO: 0.02 K/UL (ref 0–0.04)
IMM GRANULOCYTES NFR BLD AUTO: 0.3 % (ref 0–0.5)
LDH SERPL-CCNC: 179 U/L (ref 110–260)
LYMPHOCYTES # BLD AUTO: 2 K/UL (ref 1–4.8)
MCH RBC QN AUTO: 28.8 PG (ref 27–31)
MCHC RBC AUTO-ENTMCNC: 34.1 G/DL (ref 32–36)
MCV RBC AUTO: 85 FL (ref 82–98)
NUCLEATED RBC (/100WBC) (OHS): 0 /100 WBC
PLATELET # BLD AUTO: 201 K/UL (ref 150–450)
PMV BLD AUTO: 10 FL (ref 9.2–12.9)
POTASSIUM SERPL-SCNC: 4.5 MMOL/L (ref 3.5–5.1)
PROT SERPL-MCNC: 7.3 GM/DL (ref 6–8.4)
RBC # BLD AUTO: 5.03 M/UL (ref 4–5.4)
RELATIVE EOSINOPHIL (OHS): 2.2 %
RELATIVE LYMPHOCYTE (OHS): 31 % (ref 18–48)
RELATIVE MONOCYTE (OHS): 5.6 % (ref 4–15)
RELATIVE NEUTROPHIL (OHS): 60.3 % (ref 38–73)
SODIUM SERPL-SCNC: 142 MMOL/L (ref 136–145)
WBC # BLD AUTO: 6.46 K/UL (ref 3.9–12.7)

## 2025-08-15 PROCEDURE — 85652 RBC SED RATE AUTOMATED: CPT

## 2025-08-15 PROCEDURE — 80053 COMPREHEN METABOLIC PANEL: CPT | Mod: PN

## 2025-08-15 PROCEDURE — 83615 LACTATE (LD) (LDH) ENZYME: CPT | Mod: PN

## 2025-08-15 PROCEDURE — 36415 COLL VENOUS BLD VENIPUNCTURE: CPT | Mod: PN

## 2025-08-15 PROCEDURE — 85025 COMPLETE CBC W/AUTO DIFF WBC: CPT | Mod: PN

## 2025-08-18 ENCOUNTER — OFFICE VISIT (OUTPATIENT)
Dept: HEMATOLOGY/ONCOLOGY | Facility: CLINIC | Age: 75
End: 2025-08-18
Payer: MEDICARE

## 2025-08-18 VITALS
HEIGHT: 61 IN | OXYGEN SATURATION: 93 % | BODY MASS INDEX: 32.46 KG/M2 | DIASTOLIC BLOOD PRESSURE: 71 MMHG | HEART RATE: 65 BPM | SYSTOLIC BLOOD PRESSURE: 122 MMHG | RESPIRATION RATE: 20 BRPM | WEIGHT: 171.94 LBS

## 2025-08-18 DIAGNOSIS — Z85.3 HISTORY OF BREAST CANCER: ICD-10-CM

## 2025-08-18 DIAGNOSIS — C82.54 DIFFUSE FOLLICLE CENTER LYMPHOMA OF LYMPH NODES OF AXILLA: Primary | ICD-10-CM

## 2025-08-18 DIAGNOSIS — E66.9 OBESITY (BMI 30-39.9): ICD-10-CM

## 2025-08-18 DIAGNOSIS — R92.8 ABNORMAL MAMMOGRAM OF LEFT BREAST: ICD-10-CM

## 2025-08-18 PROCEDURE — 3074F SYST BP LT 130 MM HG: CPT | Mod: CPTII,S$GLB,,

## 2025-08-18 PROCEDURE — 1160F RVW MEDS BY RX/DR IN RCRD: CPT | Mod: CPTII,S$GLB,,

## 2025-08-18 PROCEDURE — 1159F MED LIST DOCD IN RCRD: CPT | Mod: CPTII,S$GLB,,

## 2025-08-18 PROCEDURE — 4010F ACE/ARB THERAPY RXD/TAKEN: CPT | Mod: CPTII,S$GLB,,

## 2025-08-18 PROCEDURE — 3078F DIAST BP <80 MM HG: CPT | Mod: CPTII,S$GLB,,

## 2025-08-18 PROCEDURE — 3044F HG A1C LEVEL LT 7.0%: CPT | Mod: CPTII,S$GLB,,

## 2025-08-18 PROCEDURE — 3066F NEPHROPATHY DOC TX: CPT | Mod: CPTII,S$GLB,,

## 2025-08-18 PROCEDURE — 99999 PR PBB SHADOW E&M-EST. PATIENT-LVL IV: CPT | Mod: PBBFAC,,,

## 2025-08-18 PROCEDURE — 1126F AMNT PAIN NOTED NONE PRSNT: CPT | Mod: CPTII,S$GLB,,

## 2025-08-18 PROCEDURE — 99214 OFFICE O/P EST MOD 30 MIN: CPT | Mod: S$GLB,,,

## 2025-08-18 PROCEDURE — 3061F NEG MICROALBUMINURIA REV: CPT | Mod: CPTII,S$GLB,,

## (undated) DEVICE — NDL TUOHY EPIDURAL 20G X 3.5

## (undated) DEVICE — APPLICATOR CHLORAPREP CLR 10.5

## (undated) DEVICE — TRAY NERVE BLOCK

## (undated) DEVICE — MARKER SKIN STND TIP BLUE BARR

## (undated) DEVICE — SYR GLASS 5CC LUER LOK

## (undated) DEVICE — GLOVE SURGICAL LATEX SZ 7